# Patient Record
Sex: FEMALE | Race: WHITE | NOT HISPANIC OR LATINO | Employment: FULL TIME
[De-identification: names, ages, dates, MRNs, and addresses within clinical notes are randomized per-mention and may not be internally consistent; named-entity substitution may affect disease eponyms.]

---

## 2017-06-02 ENCOUNTER — RECORDS - HEALTHEAST (OUTPATIENT)
Dept: ADMINISTRATIVE | Facility: OTHER | Age: 57
End: 2017-06-02

## 2017-06-06 ENCOUNTER — RECORDS - HEALTHEAST (OUTPATIENT)
Dept: ADMINISTRATIVE | Facility: OTHER | Age: 57
End: 2017-06-06

## 2017-07-18 ENCOUNTER — HOSPITAL ENCOUNTER (OUTPATIENT)
Dept: MAMMOGRAPHY | Facility: HOSPITAL | Age: 57
Discharge: HOME | End: 2017-07-18
Attending: OBSTETRICS & GYNECOLOGY

## 2017-07-18 DIAGNOSIS — Z12.31 VISIT FOR SCREENING MAMMOGRAM: ICD-10-CM

## 2017-08-02 ENCOUNTER — OFFICE VISIT - HEALTHEAST (OUTPATIENT)
Dept: FAMILY MEDICINE | Facility: CLINIC | Age: 57
End: 2017-08-02

## 2017-08-02 DIAGNOSIS — Z00.00 HEALTH CARE MAINTENANCE: ICD-10-CM

## 2017-08-02 DIAGNOSIS — S62.102A LEFT WRIST FRACTURE: ICD-10-CM

## 2017-08-02 DIAGNOSIS — Z13.820 OSTEOPOROSIS SCREENING: ICD-10-CM

## 2017-08-02 LAB
CHOLEST SERPL-MCNC: 286 MG/DL
FASTING STATUS PATIENT QL REPORTED: YES
HDLC SERPL-MCNC: 97 MG/DL
LDLC SERPL CALC-MCNC: 174 MG/DL
TRIGL SERPL-MCNC: 74 MG/DL

## 2017-08-02 ASSESSMENT — MIFFLIN-ST. JEOR: SCORE: 1269.59

## 2017-08-07 ENCOUNTER — COMMUNICATION - HEALTHEAST (OUTPATIENT)
Dept: FAMILY MEDICINE | Facility: CLINIC | Age: 57
End: 2017-08-07

## 2017-08-24 ENCOUNTER — RECORDS - HEALTHEAST (OUTPATIENT)
Dept: BONE DENSITY | Facility: CLINIC | Age: 57
End: 2017-08-24

## 2017-08-24 ENCOUNTER — RECORDS - HEALTHEAST (OUTPATIENT)
Dept: ADMINISTRATIVE | Facility: OTHER | Age: 57
End: 2017-08-24

## 2017-08-24 DIAGNOSIS — Z13.820 ENCOUNTER FOR SCREENING FOR OSTEOPOROSIS: ICD-10-CM

## 2017-08-30 ENCOUNTER — COMMUNICATION - HEALTHEAST (OUTPATIENT)
Dept: FAMILY MEDICINE | Facility: CLINIC | Age: 57
End: 2017-08-30

## 2017-08-30 DIAGNOSIS — M85.80 OSTEOPENIA: ICD-10-CM

## 2017-09-05 ENCOUNTER — COMMUNICATION - HEALTHEAST (OUTPATIENT)
Dept: FAMILY MEDICINE | Facility: CLINIC | Age: 57
End: 2017-09-05

## 2017-12-07 ENCOUNTER — OFFICE VISIT - HEALTHEAST (OUTPATIENT)
Dept: INTERNAL MEDICINE | Facility: CLINIC | Age: 57
End: 2017-12-07

## 2017-12-07 DIAGNOSIS — M85.80 LOW BONE MASS: ICD-10-CM

## 2017-12-07 ASSESSMENT — MIFFLIN-ST. JEOR: SCORE: 1324.76

## 2018-06-22 ENCOUNTER — RECORDS - HEALTHEAST (OUTPATIENT)
Dept: ADMINISTRATIVE | Facility: OTHER | Age: 58
End: 2018-06-22

## 2018-08-03 ENCOUNTER — OFFICE VISIT - HEALTHEAST (OUTPATIENT)
Dept: FAMILY MEDICINE | Facility: CLINIC | Age: 58
End: 2018-08-03

## 2018-08-03 DIAGNOSIS — F41.9 ANXIETY: ICD-10-CM

## 2018-08-03 DIAGNOSIS — M85.80 OSTEOPENIA: ICD-10-CM

## 2018-08-03 DIAGNOSIS — Z00.00 HEALTH CARE MAINTENANCE: ICD-10-CM

## 2018-08-03 LAB
CHOLEST SERPL-MCNC: 308 MG/DL
FASTING STATUS PATIENT QL REPORTED: YES
FASTING STATUS PATIENT QL REPORTED: YES
GLUCOSE BLD-MCNC: 86 MG/DL (ref 70–125)
HDLC SERPL-MCNC: 112 MG/DL
HGB BLD-MCNC: 14.2 G/DL (ref 12–16)
LDLC SERPL CALC-MCNC: 182 MG/DL
TRIGL SERPL-MCNC: 71 MG/DL

## 2018-08-03 ASSESSMENT — MIFFLIN-ST. JEOR: SCORE: 1322.89

## 2018-08-06 ENCOUNTER — HOSPITAL ENCOUNTER (OUTPATIENT)
Dept: MAMMOGRAPHY | Facility: CLINIC | Age: 58
Discharge: HOME | End: 2018-08-06
Attending: OBSTETRICS & GYNECOLOGY

## 2018-08-06 DIAGNOSIS — Z12.31 VISIT FOR SCREENING MAMMOGRAM: ICD-10-CM

## 2018-08-06 LAB — 25(OH)D3 SERPL-MCNC: 39.4 NG/ML (ref 30–80)

## 2018-08-07 ENCOUNTER — AMBULATORY - HEALTHEAST (OUTPATIENT)
Dept: NURSING | Facility: CLINIC | Age: 58
End: 2018-08-07

## 2018-08-07 ENCOUNTER — COMMUNICATION - HEALTHEAST (OUTPATIENT)
Dept: FAMILY MEDICINE | Facility: CLINIC | Age: 58
End: 2018-08-07

## 2018-08-07 DIAGNOSIS — Z00.00 ROUTINE GENERAL MEDICAL EXAMINATION AT A HEALTH CARE FACILITY: ICD-10-CM

## 2018-10-10 ENCOUNTER — OFFICE VISIT - HEALTHEAST (OUTPATIENT)
Dept: FAMILY MEDICINE | Facility: CLINIC | Age: 58
End: 2018-10-10

## 2018-10-10 DIAGNOSIS — F41.9 ANXIETY: ICD-10-CM

## 2018-10-10 DIAGNOSIS — Z00.00 HEALTH CARE MAINTENANCE: ICD-10-CM

## 2018-10-10 DIAGNOSIS — E78.2 MIXED HYPERLIPIDEMIA: ICD-10-CM

## 2018-10-10 DIAGNOSIS — Z12.83 SKIN CANCER SCREENING: ICD-10-CM

## 2018-10-21 ENCOUNTER — COMMUNICATION - HEALTHEAST (OUTPATIENT)
Dept: FAMILY MEDICINE | Facility: CLINIC | Age: 58
End: 2018-10-21

## 2018-10-22 ENCOUNTER — COMMUNICATION - HEALTHEAST (OUTPATIENT)
Dept: FAMILY MEDICINE | Facility: CLINIC | Age: 58
End: 2018-10-22

## 2018-10-26 ENCOUNTER — COMMUNICATION - HEALTHEAST (OUTPATIENT)
Dept: FAMILY MEDICINE | Facility: CLINIC | Age: 58
End: 2018-10-26

## 2018-11-28 ENCOUNTER — RECORDS - HEALTHEAST (OUTPATIENT)
Dept: ADMINISTRATIVE | Facility: OTHER | Age: 58
End: 2018-11-28

## 2019-06-28 ENCOUNTER — RECORDS - HEALTHEAST (OUTPATIENT)
Dept: ADMINISTRATIVE | Facility: OTHER | Age: 59
End: 2019-06-28

## 2019-08-05 ENCOUNTER — OFFICE VISIT - HEALTHEAST (OUTPATIENT)
Dept: FAMILY MEDICINE | Facility: CLINIC | Age: 59
End: 2019-08-05

## 2019-08-05 DIAGNOSIS — M85.80 OSTEOPENIA, UNSPECIFIED LOCATION: ICD-10-CM

## 2019-08-05 DIAGNOSIS — F41.9 ANXIETY: ICD-10-CM

## 2019-08-05 DIAGNOSIS — Z11.4 SCREENING FOR HIV (HUMAN IMMUNODEFICIENCY VIRUS): ICD-10-CM

## 2019-08-05 DIAGNOSIS — Z00.00 ROUTINE HISTORY AND PHYSICAL EXAMINATION OF ADULT: ICD-10-CM

## 2019-08-05 DIAGNOSIS — E78.2 MIXED HYPERLIPIDEMIA: ICD-10-CM

## 2019-08-05 DIAGNOSIS — Z11.59 NEED FOR HEPATITIS C SCREENING TEST: ICD-10-CM

## 2019-08-05 DIAGNOSIS — H61.22 IMPACTED CERUMEN OF LEFT EAR: ICD-10-CM

## 2019-08-05 LAB
CHOLEST SERPL-MCNC: 279 MG/DL
FASTING STATUS PATIENT QL REPORTED: YES
FASTING STATUS PATIENT QL REPORTED: YES
GLUCOSE BLD-MCNC: 100 MG/DL (ref 70–125)
HDLC SERPL-MCNC: 85 MG/DL
HGB BLD-MCNC: 13 G/DL (ref 12–16)
HIV 1+2 AB+HIV1 P24 AG SERPL QL IA: NEGATIVE
LDLC SERPL CALC-MCNC: 174 MG/DL
TRIGL SERPL-MCNC: 98 MG/DL

## 2019-08-05 ASSESSMENT — MIFFLIN-ST. JEOR: SCORE: 1275.55

## 2019-08-06 LAB
25(OH)D3 SERPL-MCNC: 48.6 NG/ML (ref 30–80)
HCV AB SERPL QL IA: NEGATIVE

## 2019-08-07 ENCOUNTER — COMMUNICATION - HEALTHEAST (OUTPATIENT)
Dept: FAMILY MEDICINE | Facility: CLINIC | Age: 59
End: 2019-08-07

## 2019-08-21 ENCOUNTER — RECORDS - HEALTHEAST (OUTPATIENT)
Dept: ADMINISTRATIVE | Facility: OTHER | Age: 59
End: 2019-08-21

## 2019-08-21 ENCOUNTER — RECORDS - HEALTHEAST (OUTPATIENT)
Dept: BONE DENSITY | Facility: CLINIC | Age: 59
End: 2019-08-21

## 2019-08-21 ENCOUNTER — HOSPITAL ENCOUNTER (OUTPATIENT)
Dept: MAMMOGRAPHY | Facility: CLINIC | Age: 59
Discharge: HOME | End: 2019-08-21
Attending: OBSTETRICS & GYNECOLOGY

## 2019-08-21 DIAGNOSIS — Z78.0 ASYMPTOMATIC MENOPAUSAL STATE: ICD-10-CM

## 2019-08-21 DIAGNOSIS — Z13.820 ENCOUNTER FOR SCREENING FOR OSTEOPOROSIS: ICD-10-CM

## 2019-08-21 DIAGNOSIS — Z12.31 VISIT FOR SCREENING MAMMOGRAM: ICD-10-CM

## 2019-09-13 ENCOUNTER — OFFICE VISIT - HEALTHEAST (OUTPATIENT)
Dept: INTERNAL MEDICINE | Facility: CLINIC | Age: 59
End: 2019-09-13

## 2019-09-13 DIAGNOSIS — M85.80 OSTEOPENIA, UNSPECIFIED LOCATION: ICD-10-CM

## 2020-07-29 ENCOUNTER — COMMUNICATION - HEALTHEAST (OUTPATIENT)
Dept: FAMILY MEDICINE | Facility: CLINIC | Age: 60
End: 2020-07-29

## 2020-07-29 DIAGNOSIS — F41.9 ANXIETY: ICD-10-CM

## 2020-07-29 RX ORDER — HYDROXYZINE HYDROCHLORIDE 25 MG/1
TABLET, FILM COATED ORAL
Qty: 180 TABLET | Refills: 3 | Status: SHIPPED | OUTPATIENT
Start: 2020-07-29 | End: 2021-08-10

## 2021-04-26 ENCOUNTER — OFFICE VISIT - HEALTHEAST (OUTPATIENT)
Dept: FAMILY MEDICINE | Facility: CLINIC | Age: 61
End: 2021-04-26

## 2021-04-26 DIAGNOSIS — E66.3 OVERWEIGHT (BMI 25.0-29.9): ICD-10-CM

## 2021-04-26 DIAGNOSIS — Z00.00 ROUTINE HISTORY AND PHYSICAL EXAMINATION OF ADULT: ICD-10-CM

## 2021-04-26 DIAGNOSIS — M85.80 OSTEOPENIA, UNSPECIFIED LOCATION: ICD-10-CM

## 2021-04-26 LAB
CHOLEST SERPL-MCNC: 304 MG/DL
FASTING STATUS PATIENT QL REPORTED: YES
FASTING STATUS PATIENT QL REPORTED: YES
GLUCOSE BLD-MCNC: 85 MG/DL (ref 70–125)
HDLC SERPL-MCNC: 86 MG/DL
HGB BLD-MCNC: 13.7 G/DL (ref 12–16)
LDLC SERPL CALC-MCNC: 200 MG/DL
TRIGL SERPL-MCNC: 89 MG/DL

## 2021-04-26 RX ORDER — IBUPROFEN 100 MG/5ML
SUSPENSION, ORAL (FINAL DOSE FORM) ORAL EVERY 6 HOURS PRN
Status: SHIPPED | COMMUNITY
Start: 2021-04-26 | End: 2022-07-06

## 2021-04-26 ASSESSMENT — MIFFLIN-ST. JEOR: SCORE: 1329.93

## 2021-04-27 LAB — 25(OH)D3 SERPL-MCNC: 48.1 NG/ML (ref 30–80)

## 2021-04-28 ENCOUNTER — COMMUNICATION - HEALTHEAST (OUTPATIENT)
Dept: FAMILY MEDICINE | Facility: CLINIC | Age: 61
End: 2021-04-28

## 2021-05-07 ENCOUNTER — HOSPITAL ENCOUNTER (OUTPATIENT)
Dept: MAMMOGRAPHY | Facility: CLINIC | Age: 61
Discharge: HOME | End: 2021-05-07
Attending: FAMILY MEDICINE
Payer: COMMERCIAL

## 2021-05-07 DIAGNOSIS — Z12.31 VISIT FOR SCREENING MAMMOGRAM: ICD-10-CM

## 2021-05-31 VITALS — WEIGHT: 161.6 LBS | HEIGHT: 66 IN | BODY MASS INDEX: 25.97 KG/M2

## 2021-05-31 VITALS — WEIGHT: 151.19 LBS | HEIGHT: 66 IN | BODY MASS INDEX: 24.3 KG/M2

## 2021-05-31 NOTE — PROGRESS NOTES
Assessment:        1. Routine history and physical examination of adult  Glucose    Hemoglobin   2. Need for hepatitis C screening test  Hepatitis C Antibody (Anti-HCV)   3. Screening for HIV (human immunodeficiency virus)  HIV Antigen/Antibody Screening Cascade   4. Mixed hyperlipidemia  Lipid Cascade   5. Anxiety  hydrOXYzine HCl (ATARAX) 25 MG tablet   6. Impacted cerumen of left ear  Ear cerumen removal   7. Osteopenia, unspecified location  Vitamin D, Total (25-Hydroxy)       Plan:      1. Healthcare maintenance: Check fasting lipids, glucose and hemoglobin today.  Mammogram was ordered by gynecologist.  Colonoscopy is up-to-date.  Pap smears are performed by her gynecologist and was last done in 2017.  Continue with regular exercise, adequate calcium and vitamin D intake and regular vision and dental exams.  Hepatitis C and HIV screens will be performed today.Follow-up in 1 year for yearly physical  2. Hyperlipidemia: Check lipids.  Continue with weight loss and healthy lifestyle changes  3. Anxiety: Continue hydroxyzine on an as-needed basis.  Continue regular exercises and stress management  4. Cerumen impaction left ear: Ear lavage performed today by CSS  5. Osteopenia: Check vitamin D level.  Continue vitamin D  supplement and adequate calcium intake.  Continue regular weightbearing exercise.  She has bone density scan ordered.  She will schedule follow-up with osteoporosis specialist.          Subjective:      Shawna Powell is a 59 y.o. female who presents for an annual exam.  We reviewed her health history.  No significant changes in her health since she was seen for physical last year.  She has history of osteopenia.  She is taking a vitamin D supplement.  She gets calcium in her diet.  Bone density scan was ordered by her gynecologist.  She will plan to schedule follow-up with osteoporosis specialist.  She has been exercising regularly.  She has been working hard on improving her diet and has  lost about 16 pounds since the beginning of the year.  She is feeling better with these lifestyle changes.  She gets regular vision and dental exams.  She uses hydroxyzine on an as-needed basis for anxiety.  Takes no other regular medications.  We reviewed and updated family and social history.  Meds and allergies are reviewed and updated.  She sees a dermatologist regularly.  Review of systems is assessed and is otherwise negative.  No other concerns or questions.    Healthy Habits:   Regular Exercise: Yes  Sunscreen Use: Yes  Healthy Diet: Yes  Dental Visits Regularly: Yes  Seat Belt: Yes  Self Breast Exam Monthly:Yes  Hemoccults: N/A  Flex Sig: N/A  Colonoscopy: Yes  Lipid Profile: Yes  Glucose Screen: Yes  Prevention of Osteoporosis: Yes  Last Dexa:         Immunization History   Administered Date(s) Administered     Influenza, inj, historic,unspecified 10/02/2018     Tdap 2017     ZOSTER, RECOMBINANT, IM 2018, 10/10/2018     Immunization status: up to date and documented.      Gynecologic History  No LMP recorded. Patient is postmenopausal.  Contraception: post menopausal status  Last Pap: . Results were: normal  Last mammogram: . Results were: normal      OB History    Para Term  AB Living       0         SAB TAB Ectopic Multiple Live Births                   Current Outpatient Medications   Medication Sig Dispense Refill     cholecalciferol, vitamin D3, (VITAMIN D3) 2,000 unit capsule Take 1,000 Units by mouth daily.       hydrOXYzine HCl (ATARAX) 25 MG tablet Take 1-2 tablets by mouth at bedtime as needed 180 tablet 3     ACETAMINOPHEN (TYLENOL ORAL) Take by mouth as needed.       No current facility-administered medications for this visit.      Past Medical History:   Diagnosis Date     Abnormal Pap smear     Positive HPV, colp x2, cryotherapy     Breast cyst      Herpes      Past Surgical History:   Procedure Laterality Date     BREAST CYST ASPIRATION Left       CERVIX LESION DESTRUCTION       ESSURE TUBAL LIGATION       PTERYGIUM EXCISION W/ GRAFT       TONSILLECTOMY       Patient has no known allergies.  Family History   Problem Relation Age of Onset     Breast cancer Mother 55        HRT     GLENN disease Mother      Cancer Mother         melanoma     Stomach cancer Paternal Grandmother 85     Hypertension Paternal Uncle      Diabetes Paternal Uncle      Heart attack Paternal Uncle      Alcohol abuse Father      Alcohol abuse Paternal Grandfather      Social History     Socioeconomic History     Marital status: Single     Spouse name: Not on file     Number of children: Not on file     Years of education: Not on file     Highest education level: Not on file   Occupational History     Not on file   Social Needs     Financial resource strain: Not on file     Food insecurity:     Worry: Not on file     Inability: Not on file     Transportation needs:     Medical: Not on file     Non-medical: Not on file   Tobacco Use     Smoking status: Never Smoker     Smokeless tobacco: Never Used   Substance and Sexual Activity     Alcohol use: No     Drug use: No     Sexual activity: Never   Lifestyle     Physical activity:     Days per week: Not on file     Minutes per session: Not on file     Stress: Not on file   Relationships     Social connections:     Talks on phone: Not on file     Gets together: Not on file     Attends Alevism service: Not on file     Active member of club or organization: Not on file     Attends meetings of clubs or organizations: Not on file     Relationship status: Not on file     Intimate partner violence:     Fear of current or ex partner: Not on file     Emotionally abused: Not on file     Physically abused: Not on file     Forced sexual activity: Not on file   Other Topics Concern     Not on file   Social History Narrative     Not on file       Review of Systems  General:  Denies problem  Eyes: Denies problem  Ears/Nose/Throat: Denies  "problem  Cardiovascular: Denies problem  Respiratory:  Denies problem  Gastrointestinal:  Denies problem, Genitourinary: Denies problem  Musculoskeletal:  Denies problem  Skin: Denies problem  Neurologic: Denies problem  Psychiatric: Denies problem  Endocrine: Denies problem  Heme/Lymphatic: Denies problem   Allergic/Immunologic: Denies problem        Objective:         /64 (Patient Site: Right Arm, Patient Position: Sitting, Cuff Size: Adult Large)   Pulse 80   Temp 98.6  F (37  C) (Oral)   Ht 5' 5.5\" (1.664 m)   Wt 152 lb 8 oz (69.2 kg)   BMI 24.99 kg/m        Physical Exam:  General Appearance: Alert, cooperative, no distress, appears stated age  Head: Normocephalic, without obvious abnormality, atraumatic  Eyes: PERRL, conjunctiva/corneas clear, EOM's intact  Ears: Right TM normal.  Cerumen impaction present in left external ear.  Nose: Nares normal, septum midline,mucosa normal, no drainage  Throat: Lips, mucosa, and tongue normal; teeth and gums normal  Neck: Supple, symmetrical, trachea midline, no adenopathy;  thyroid: not enlarged, symmetric, no tenderness/mass/nodules;  Back: Symmetric, no curvature, ROM normal  Lungs: Clear to auscultation bilaterally, respirations unlabored  Heart: Regular rate and rhythm, S1 and S2 normal, no murmur, rub, or gallop, Abdomen: Soft, non-tender, bowel sounds active all four quadrants,  no masses, no organomegaly  Pelvic:Not examined  Extremities: Extremities normal, atraumatic, no cyanosis or edema  Skin: Skin color, texture, turgor normal, no rashes or lesions  Lymph nodes: Cervical, supraclavicular, and axillary nodes normal  Neurologic: Normal        "

## 2021-05-31 NOTE — PATIENT INSTRUCTIONS - HE
Hello! Here are the two sites that can be used for the CBD oil. This first link ([employer]) is what the pain clinic uses and it is a little cheaper.     https://[email or web address]/classic-hemp-cbd-oil/   20 drops twice a day. However, I would start with 5 drops twice a day, then increase up. Easier to add more drops than start high and take it away :) Place on your tongue.     Https://www.[employer]/us/[email or web address]&[email or web address]&[email or web address]&Ntt=CBD%20Gold%20Original%20w/Dropper&tpsearch=true   Place two pumps under the tongue, once or twice daily. Hold for 60-90 seconds, then swallow.           Osteoporosis specialist--Dr. Love

## 2021-06-01 VITALS — WEIGHT: 162.06 LBS | HEIGHT: 66 IN | BODY MASS INDEX: 26.05 KG/M2

## 2021-06-01 NOTE — PROGRESS NOTES
[facility] follow up Note    Assessment/Plan:  1. Osteopenia, unspecified location  Here to review bone density.  Since last bone densitometry-2017, all has remained stable.  Low risk bone density with osteopenia.  Recommendations: Continue as you are doing with both balance and weightbearing exercises.  Continue with vitamin D supplementation at 3000 IUs daily with optimal levels reported in the labs.  Continue with a goal of the thousand milligrams of calcium daily via dietary measures.      Follow up as needed    Rand Love MD    Chief Complaint:  Chief Complaint   Patient presents with     Follow-up     DXA       History of Present Illness:  Shawna is a 59 y.o. female who is here today for discussion of bone health.  Of note, I met her following a bone density in 2017 that revealed low bone mass.  It was a low risk bone densitometry.  At that time, we had an extensive discussion of bone health.  She is here today to review her bone density.  Of note, since last visit, she has increased vitamin D3 to 3000 IUs daily.  Her most recent laboratory study is ideal.  She is working hard with regard to dietary calcium as well.  She eats a variety of dark green leafy vegetables, drinks a glass of milk per day and has cheese.  She has joined a fitness club that has a variety of weightbearing exercises.    Bone densitometry is reviewed with her today.  The images are posted and reviewed.    Review of Systems:  A comprehensive review of systems was performed and was otherwise negative    PFSH:  Social History: No changes  Social History     Tobacco Use   Smoking Status Never Smoker   Smokeless Tobacco Never Used       Past History: No new history with regard to bone health  Current Outpatient Medications   Medication Sig Dispense Refill     ACETAMINOPHEN (TYLENOL ORAL) Take by mouth as needed.       cholecalciferol, vitamin D3, (VITAMIN D3) 2,000 unit capsule Take 1,000 Units by mouth  daily.       hydrOXYzine HCl (ATARAX) 25 MG tablet Take 1-2 tablets by mouth at bedtime as needed 180 tablet 3     No current facility-administered medications for this visit.        Family History:     Physical Exam:  General Appearance:   Pleasant, well-appearing and in no acute distress  Vitals:    09/13/19 0738   BP: 98/64   Patient Site: Left Arm   Patient Position: Sitting   Cuff Size: Adult Regular   Pulse: 68   SpO2: 96%   Weight: 150 lb 6 oz (68.2 kg)     Wt Readings from Last 3 Encounters:   09/13/19 150 lb 6 oz (68.2 kg)   08/05/19 152 lb 8 oz (69.2 kg)   10/10/18 160 lb 8 oz (72.8 kg)     Body mass index is 24.64 kg/m .

## 2021-06-02 VITALS — WEIGHT: 160.5 LBS | BODY MASS INDEX: 26.1 KG/M2

## 2021-06-03 VITALS
HEART RATE: 68 BPM | WEIGHT: 150.38 LBS | OXYGEN SATURATION: 96 % | SYSTOLIC BLOOD PRESSURE: 98 MMHG | BODY MASS INDEX: 24.64 KG/M2 | DIASTOLIC BLOOD PRESSURE: 64 MMHG

## 2021-06-03 VITALS — WEIGHT: 152.5 LBS | BODY MASS INDEX: 24.51 KG/M2 | HEIGHT: 66 IN

## 2021-06-05 VITALS
WEIGHT: 165 LBS | HEART RATE: 68 BPM | BODY MASS INDEX: 27.49 KG/M2 | HEIGHT: 65 IN | SYSTOLIC BLOOD PRESSURE: 130 MMHG | DIASTOLIC BLOOD PRESSURE: 80 MMHG

## 2021-06-12 NOTE — PROGRESS NOTES
Assessment:        1. Health care maintenance  Lipid [place]ubert FASTING   2. Left wrist fracture  Comprehensive Metabolic Panel    Vitamin D, Total (25-Hydroxy)    Thyroid Stimulating Hormone (TSH)    HM2(CBC w/o Differential)   3. Osteoporosis screening  DXA Bone Density Scan       Plan:      1. Healthcare maintenance: Pap smear, mammogram and colonoscopy are up-to-date.  We will check fasting lipids and glucose.  Encouraged regular exercise, healthy diet and adequate calcium intake.  Adacel vaccine administered.  Discussed vaccine and common side effects  2. Left wrist fracture: This has healed.  She is concerned about her bone density and vitamin D status.  We will check a comprehensive metabolic panel, vitamin D, TSH and hemogram today.  Bone density scan also ordered.  Discussed importance of adequate calcium and vitamin D intake.  Further recommendations will be made once those results are available.          Subjective:      Shawna Powell is a 57 y.o. female who presents for an annual exam.  We reviewed her health history.  Overall healthy individual.  She is concerned about possible vitamin D deficiency as well as her bone density.  She had a fracture of the left wrist in May of this year.  She was placed in a cast.  Reports that has healed well.  She did do some physical therapy.  Fracture occurred after a fall.  She was trying to do some roller skiing.  She reports that in January she also had a crown placed on 1 of her teeth due to a cracked tooth.  She was advised to have her vitamin D level checked.  She reports that she eats very healthy.  Eats a clean diet and tries to avoid processed foods.  Does not eat fried foods.  Eats plenty of fresh fruits and vegetables.  Admits that she does not get a lot of calcium in her diet.  She does eat cheese every day and uses better but does not drink much milk.  Recently started a calcium supplement.  Also takes a multivitamin but this does not have calcium.   There is not a strong family history of osteoporosis that she is aware of.  She does have a history of following a vegetarian and vegan diet but she is currently eating meat.  We reviewed past medical history, surgeries and family history.  She is not .  She has no children.  She works at [employer] and works with [employer]  She enjoys [employer] and participates in field trips and enjoys hunting for mushrooms.  She is up-to-date on her colonoscopy and mammogram.  She sees a gynecologist for Pap and breast exams.  Recently had that performed.  History of abnormal Paps over 15 years ago.  Subsequent Paps have been normal following cryosurgery of the cervix.  Review of systems is assessed and is otherwise negative.  No other concerns or questions today.    Healthy Habits:   Regular Exercise: Yes  Sunscreen Use: Yes  Healthy Diet: Yes  Dental Visits Regularly: Yes  Seat Belt: Yes  Sexually active: No  Self Breast Exam Monthly:Yes  Hemoccults: No  Flex Sig: No  Colonoscopy: Yes  Lipid Profile: Yes  Glucose Screen: Yes  Prevention of Osteoporosis: Yes  Last Dexa: N/A  Guns at Home:  N/A      Immunization History   Administered Date(s) Administered     Tdap 2017     Immunization status: due today.      Gynecologic History  No LMP recorded. Patient is postmenopausal.  Contraception: post menopausal status  Last Pap: . Results were: normal  Last mammogram: . Results were: normal      OB History    Para Term  AB Living     0      SAB TAB Ectopic Multiple Live Births                    Current Outpatient Prescriptions   Medication Sig Dispense Refill     ACETAMINOPHEN (TYLENOL ORAL) Take by mouth as needed.       No current facility-administered medications for this visit.      Past Medical History:   Diagnosis Date     Abnormal Pap smear     Positive HPV, colp x2, cryotherapy     Breast cyst      Herpes      Past Surgical History:   Procedure Laterality Date     BREAST CYST ASPIRATION Left       "CERVIX LESION DESTRUCTION       ESSURE TUBAL LIGATION       PTERYGIUM EXCISION W/ GRAFT       TONSILLECTOMY       Review of patient's allergies indicates no known allergies.  Family History   Problem Relation Age of Onset     Breast cancer Mother 55     HRT     GLENN disease Mother      Stomach cancer Paternal Grandmother 85     Hypertension Paternal Uncle      Diabetes Paternal Uncle      Heart attack Paternal Uncle      Alcohol abuse Father      Alcohol abuse Paternal Grandfather      Social History     Social History     Marital status: Single     Spouse name: N/A     Number of children: N/A     Years of education: N/A     Occupational History     Not on file.     Social History Main Topics     Smoking status: Never Smoker     Smokeless tobacco: Never Used     Alcohol use No     Drug use: No     Sexual activity: No     Other Topics Concern     Not on file     Social History Narrative       Review of Systems  See HPI    Objective:         /80 (Patient Site: Left Arm, Patient Position: Sitting, Cuff Size: Adult Large)  Pulse 67  Temp 98.2  F (36.8  C) (Tympanic)   Ht 5' 5.5\" (1.664 m)  Wt 151 lb 3 oz (68.6 kg)  SpO2 99%  BMI 24.78 kg/m2      Physical Exam:  General Appearance: Alert, cooperative, no distress, appears stated age  Head: Normocephalic, without obvious abnormality, atraumatic  Eyes: PERRL, conjunctiva/corneas clear, EOM's intact  Ears: Normal TM's and external ear canals, both ears  Nose: Nares normal, septum midline,mucosa normal, no drainage  Throat: Lips, mucosa, and tongue normal; teeth and gums normal  Neck: Supple, symmetrical, trachea midline, no adenopathy;  thyroid: not enlarged, symmetric, no tenderness/mass/nodules;   Back: Symmetric, no curvature, ROM normal  Lungs: Clear to auscultation bilaterally, respirations unlabored  Heart: Regular rate and rhythm, S1 and S2 normal, no murmur, rub, or gallop, Abdomen: Soft, non-tender, bowel sounds active all four quadrants,  no masses, no " organomegaly  Pelvic:Not examined  Extremities: Extremities normal, atraumatic, no cyanosis or edema  Skin: Skin color, texture, turgor normal, no rashes or lesions  Lymph nodes: Cervical, supraclavicular nodes normal  Neurologic: Normal

## 2021-06-14 NOTE — PROGRESS NOTES
"[facility] Bone Health Consult Note    Assessment/Plan:  1. Low bone mass/recent traumatic wrist fracture  Bone density with FRAX assessment revealing low bone mass with low risk for fracture.  Recommendations: Optimize calcium and vitamin D supplementation.  Discussion of diet and dietary calcium was had.  Vitamin D goals are 4/2000 international units of vitamin D3 daily.  Recheck vitamin D in 1 year.  Continue to be vigilant with weightbearing and balance exercise.  Consider adding yoga or Pilates.  Follow-up with bone density in 2 years.  No medications needed    Rand Love MD    Chief Complaint:  Chief Complaint   Patient presents with     Osteoporosis Consult       History of Present Illness:  Shawna is a 57 y.o. female who is here today to have a discussion with regard to overall bone health.  Of note, she is a healthy 57-year-old female who has no past medical history.  She does report a recent radial fracture.  This occurred while roller skiing.  She states the skis \"had no breaks \"and she fell.  She was a nidus that this activity when this occurred.  As a result, she had a bone density done.  This triggered today's discussion.    Bone health review of systems  Lifestyle: She is a non-smoker and does not drink alcohol  Exercise is reviewed.  She is an avid walker and works out with hand weights  Sunlight exposure is reviewed.  She gets 30 minutes of sunlight per day if whether is cooperative.  Dietary history reviewed: She is working hard to increase cheese, fortified soy milk, fortified sheep yogurt, almonds, KALE and green vegetables into her diet.  She limits caffeine to 2 cups of coffee per day  Past medical history review: Fracture at age 57-traumatic  No high risk meds are used  No family history of osteoporosis  General health survey reveals no vision or hearing problems.  She denies any dental problems.  Hormone history: Onset of menstruation at age 14.  Menopause was " "uneventful at age 53    DXA:Assessment:     1. The spine bone density L1-L4 with T-score -1.5.  2. Femoral bone densities show left femoral neck T- score -1.3 and right femoral neck T-score -1.6.  3. Trabecular bone score indicates poor trabecular bone architecture.        57 y.o. female with LOW BONE DENSITY (OSTEOPENIA) and LOW fracture risk with major osteoporotic fracture risk 8.9% and hip fracture risk 0.8%.      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       No current facility-administered medications for this visit.        Family History: No family history of osteoporosis or hip fracture    Physical Exam:  General Appearance:   She is pleasant and well-appearing and in no acute distress  Vitals:    12/07/17 1239   BP: 116/68   Patient Site: Left Arm   Patient Position: Sitting   Cuff Size: Adult Regular   Pulse: 72   Weight: 161 lb 9.6 oz (73.3 kg)   Height: 5' 6\" (1.676 m)     Wt Readings from Last 3 Encounters:   12/07/17 161 lb 9.6 oz (73.3 kg)   08/02/17 151 lb 3 oz (68.6 kg)   05/20/16 150 lb 11.2 oz (68.4 kg)     Body mass index is 26.08 kg/(m^2).        Data Review:    Analysis and Summary of Old Records (2): Reviewed health history    Records Requested (1): 0      Other History Summarized (from other people in the room) (2): 0    Radiology Tests Summarized (XRAY/CT/MRI/DXA) (1): Reviewed bone density with her    Labs Reviewed (1): Reviewed labs from her physical    Medicine Tests Reviewed (EKG/ECHO/COLONOSCOPY/EGD) (1): 0    Independent Review of EKG or X-RAY (2): 0    Time spent about 45 minutes with more than half that time " spent in counseling and discussion of bone health

## 2021-06-17 NOTE — PROGRESS NOTES
Assessment:        1. Routine history and physical examination of adult  Lipid Cascade FASTING    Glucose    Hemoglobin   2. Osteopenia, unspecified location  cholecalciferol, vitamin D3, (VITAMIN D3) 50 mcg (2,000 unit) capsule    Vitamin D, Total (25-Hydroxy)   3. Overweight (BMI 25.0-29.9)         Plan:      1. Healthcare maintenance: We will check fasting lipids, hemoglobin and glucose today.  She has a mammogram scheduled.  Colonoscopy is up-to-date with next colonoscopy due in 2023.  Pap smears are performed by her gynecologist and last Pap was in 2017 and was normal.  Continue with healthy diet and adequate calcium and vitamin D intake.  Encouraged increased physical activity.  Encouraged regular vision and dental exams.  Follow-up in 1 year for annual physical  2. Osteopenia: Will be due for bone density scan later this year.  Continue to get adequate calcium in diet and will continue vitamin D supplement.  Encourage regular weightbearing exercise  3. Overweight: Encouraged regular exercise, healthy diet and weight loss.  4. Trigger finger: Discussed she can see an orthopedic specialist for further treatment  5. Vision changes: Discussed it is possible that the vision changes she had were due to a migraine but also recommended that she schedule an eye exam         The following high BMI interventions were performed this visit: encouragement to exercise    Subjective:      Shawna Powell is a 60 y.o. female who presents for an annual exam.  We reviewed her health history including medications, allergies, family and social history.  No significant changes in her health since she was seen for her last physical in August 2019.  She reports that she really has been mostly staying home and being very careful during the pandemic.  She has been working from home.  She has gained some weight as she has not been exercising as regularly and has been doing more baking.  She has recently started making some changes  in her diet and is working on finding ways to increase her physical activity.  She does miss interacting with other people and her exercise classes.  She reports safety concerns in the neighborhood that she lives in and is starting to think about maybe moving somewhere else.  She is looking forward to retiring in a couple of years.  She has a history of osteopenia.  She is taking a vitamin D supplement.  She gets calcium in her diet.  She has hydroxyzine that she uses on an as-needed basis for anxiety.  She has noticed that this causes some ringing in her ears so she has not been taking it recently.  She does use CBD oil as an alternative.  She sees a gynecologist and has an appointment coming up soon.  She has mammogram scheduled.  She was doing quite a bit of painting over the past year and has developed a trigger finger of the right fourth finger.  She also describes a couple of occasions where she noticed some will be vision and spots in her vision.  Her mother suggested that it was a migraine although patient did not have a headache or any pain or other of the common migraine symptoms.  Review of systems is otherwise negative.  No other concerns or questions.    Healthy Habits:   Regular Exercise: No  Sunscreen Use: Yes  Healthy Diet: Yes  Dental Visits Regularly: Yes  Seat Belt: Yes  Sexually active: N/A  Self Breast Exam Monthly:Yes  Hemoccults: No  Flex Sig: No  Colonoscopy: Yes next due in 2023  Lipid Profile: Yes  Glucose Screen: Yes  Prevention of Osteoporosis: Yes  Last Dexa: 2019.  Due for follow-up in August of this year        Immunization History   Administered Date(s) Administered     Covid-19,PF,[place] 04/08/2021     Influenza, inj, historic,unspecified 10/02/2018, 09/01/2020     Tdap 08/02/2017     ZOSTER, RECOMBINANT, IM 08/07/2018, 10/10/2018     Immunization status: up to date and documented.      Gynecologic History  No LMP recorded. Patient is postmenopausal.  Contraception: post menopausal  status  Last Pap: done with gyn in 2017 Results were: normal  Last mammogram: 2019. Results were: normal      OB History    Para Term  AB Living       0         SAB TAB Ectopic Multiple Live Births                   Current Outpatient Medications   Medication Sig Dispense Refill     ACETAMINOPHEN (TYLENOL ORAL) Take by mouth as needed.       cholecalciferol, vitamin D3, (VITAMIN D3) 50 mcg (2,000 unit) capsule Take 3 capsules (3000 international unit(s)) daily  0     hydrOXYzine HCL (ATARAX) 25 MG tablet Take 1-2 tablets by mouth at bedtime as needed 180 tablet 3     ibuprofen (ADVIL,MOTRIN) 100 mg/5 mL suspension Take by mouth every 6 (six) hours as needed for mild pain (1-3).       No current facility-administered medications for this visit.      Past Medical History:   Diagnosis Date     Abnormal Pap smear     Positive HPV, colp x2, cryotherapy     Breast cyst      Herpes      Past Surgical History:   Procedure Laterality Date     BREAST CYST ASPIRATION Left 2009     CERVIX LESION DESTRUCTION       ESSURE TUBAL LIGATION       PTERYGIUM EXCISION W/ GRAFT       TONSILLECTOMY       Patient has no known allergies.  Family History   Problem Relation Age of Onset     Breast cancer Mother 55        HRT     GLENN disease Mother      Cancer Mother         melanoma     Stomach cancer Paternal Grandmother 85     Hypertension Paternal Uncle      Diabetes Paternal Uncle      Heart attack Paternal Uncle      Alcohol abuse Father      Alcohol abuse Paternal Grandfather      Social History     Socioeconomic History     Marital status: Single     Spouse name: Not on file     Number of children: Not on file     Years of education: Not on file     Highest education level: Not on file   Occupational History     Not on file   Social Needs     Financial resource strain: Not on file     Food insecurity     Worry: Not on file     Inability: Not on file     Transportation needs     Medical: Not on file     Non-medical:  "Not on file   Tobacco Use     Smoking status: Never Smoker     Smokeless tobacco: Never Used   Substance and Sexual Activity     Alcohol use: No     Drug use: No     Sexual activity: Never   Lifestyle     Physical activity     Days per week: Not on file     Minutes per session: Not on file     Stress: Not on file   Relationships     Social connections     Talks on phone: Not on file     Gets together: Not on file     Attends Scientologist service: Not on file     Active member of club or organization: Not on file     Attends meetings of clubs or organizations: Not on file     Relationship status: Not on file     Intimate partner violence     Fear of current or ex partner: Not on file     Emotionally abused: Not on file     Physically abused: Not on file     Forced sexual activity: Not on file   Other Topics Concern     Not on file   Social History Narrative     Not on file       Review of Systems    See HPI      Objective:         /80   Pulse 68   Ht 5' 5.35\" (1.66 m)   Wt 165 lb (74.8 kg)   BMI 27.16 kg/m        Physical Exam:  General Appearance: Alert, cooperative, no distress, appears stated age  Head: Normocephalic, without obvious abnormality, atraumatic  Eyes: PERRL, conjunctiva/corneas clear, EOM's intact  Ears: Normal TM's and external ear canals, both ears  Neck: Supple, symmetrical, trachea midline,   Lungs: Clear to auscultation bilaterally, respirations unlabored  Breasts: No breast masses, tenderness, asymmetry, or nipple discharge.  Heart: Regular rate and rhythm, S1 and S2 normal, no murmur, rub, or gallop, Abdomen: Soft, non-tender, bowel sounds active all four quadrants,  no masses, no organomegaly  Pelvic:Not examined  Extremities: Extremities normal, atraumatic, no cyanosis or edema  Skin: Skin color, texture, turgor normal, no rashes or lesions  Neurologic: Normal          "

## 2021-06-19 NOTE — PROGRESS NOTES
Assessment:        1. Health care maintenance  Lipid Cascade FASTING    Glucose    Hemoglobin   2. Osteopenia  Vitamin D, Total (25-Hydroxy)   3. Anxiety         Plan:      1. Healthcare maintenance: Check fasting lipids, glucose and hemoglobin today.  She will schedule mammogram.  Colonoscopy is up-to-date.  Pap smears are performed by her gynecologist and she reports that this is up-to-date as well.  Release of information form will be signed to obtain those records.  Continue with regular exercise, adequate calcium and vitamin D intake and regular vision and dental exams.  Recommend yearly influenza vaccine.  Recommend the new shingles vaccine as well.  Recommend follow-up in 1 year.  2. Osteopenia: Continue vitamin D supplement.  Check vitamin D level today.  Encouraged 6285-8605 mg of calcium daily.  Will plan follow-up bone density scan in 2019.  Encouraged regular weightbearing exercise.  3. Anxiety: Provided prescription for hydroxyzine.  Counseled on use of medication and side effects.  She will work on lifestyle changes to manage anxiety.  Contact me if she would like referral for counseling or follow-up if she feels that symptoms are worsening and hydroxyzine and lifestyle changes are not effective for managing her anxiety.          Subjective:      Shawna Powell is a 58 y.o. female who presents for an annual exam.  We reviewed her health history.  She was seen last year for physical.  Had a fracture of her left wrist at that time so was sent for bone density scan.  This showed osteopenia.  She had appointment with osteoporosis specialist.  She was started on a vitamin D supplement and follow-up DEXA scan was recommended in 2 years.  She reports that she is taking 2000 IUs of vitamin D daily.  Does not take any other medications regularly.  Occasionally takes a multivitamin.  No new allergies.  No changes in health since her last physical.  Reviewed and updated family and social history.  She does  get calcium in her diet.  Drinks about 2 glasses of soy milk at night.  Typically has yogurt for breakfast in the morning.  Eats very healthy with several servings of fruits and vegetables daily.  She exercises regularly.  Gets regular vision and dental exams.  She does report that she has been feeling more anxious recently.  She cannot really pinpoint the reason for this but does feel that it is related to current events.  She uses public transportation and admits that she feels a little bit uncomfortable and unsafe and this causes her some concern.  She recalls that several years ago she saw dermatologist for some pruritus and she was prescribed hydroxyzine.  She noticed that when she took this medication also helped her feel less anxious.  She is hesitant to take any other medication for treatment of anxiety.  She is not sure if she would be interested in counseling.  She has been trying to make some lifestyle changes and simplify her life a little bit and would like to try this first to see if she is able to manage her anxiety with those measures.  Would also be open to a trial of hydroxyzine again.  Review of systems is assessed and is otherwise negative.  No other concerns or questions today.    Healthy Habits:   Regular Exercise: Yes  Sunscreen Use: Yes  Healthy Diet: Yes  Dental Visits Regularly: Yes  Seat Belt: Yes  Self Breast Exam Monthly:Yes  Hemoccults: N/A  Flex Sig: N/A  Colonoscopy: Yes--f/u   Lipid Profile: Yes  Glucose Screen: Yes  Prevention of Osteoporosis: Yes  Last Dexa:         Immunization History   Administered Date(s) Administered     Tdap 2017     Immunization status: up to date and documented.      Gynecologic History  No LMP recorded. Patient is postmenopausal.  Contraception: none  Last Pap: 2017. Results were: normal  Last mammogram: . Results were: normal      OB History    Para Term  AB Living     0      SAB TAB Ectopic Multiple Live Births                     Current Outpatient Prescriptions   Medication Sig Dispense Refill     cholecalciferol, vitamin D3, (VITAMIN D3) 2,000 unit capsule Take 1,000 Units by mouth daily.       ACETAMINOPHEN (TYLENOL ORAL) Take by mouth as needed.       hydrOXYzine HCl (ATARAX) 25 MG tablet Take 1-2 tablets by mouth as needed for anxiety 30 tablet 1     No current facility-administered medications for this visit.      Past Medical History:   Diagnosis Date     Abnormal Pap smear 1998    Positive HPV, colp x2, cryotherapy     Breast cyst      Herpes      Past Surgical History:   Procedure Laterality Date     BREAST CYST ASPIRATION Left 2009     CERVIX LESION DESTRUCTION       ESSURE TUBAL LIGATION       PTERYGIUM EXCISION W/ GRAFT       TONSILLECTOMY       Review of patient's allergies indicates no known allergies.  Family History   Problem Relation Age of Onset     Breast cancer Mother 55     HRT     GLENN disease Mother      Stomach cancer Paternal Grandmother 85     Hypertension Paternal Uncle      Diabetes Paternal Uncle      Heart attack Paternal Uncle      Alcohol abuse Father      Alcohol abuse Paternal Grandfather      Social History     Social History     Marital status: Single     Spouse name: N/A     Number of children: N/A     Years of education: N/A     Occupational History     Not on file.     Social History Main Topics     Smoking status: Never Smoker     Smokeless tobacco: Never Used     Alcohol use No     Drug use: No     Sexual activity: No     Other Topics Concern     Not on file     Social History Narrative       Review of Systems  General:  Denies problem  Eyes: Denies problem  Ears/Nose/Throat: Denies problem  Cardiovascular: Denies problem  Respiratory:  Denies problem  Gastrointestinal:  Denies problem, Genitourinary: Denies problem  Musculoskeletal:  Denies problem  Skin: Denies problem  Neurologic: Denies problem  Psychiatric: Denies problem  Endocrine: Denies problem  Heme/Lymphatic: Denies problem  "  Allergic/Immunologic: Denies problem        Objective:         /70 (Patient Site: Right Arm, Patient Position: Sitting, Cuff Size: Adult Large)  Pulse 70  Temp 98.3  F (36.8  C) (Oral)   Ht 5' 5.75\" (1.67 m)  Wt 162 lb 1 oz (73.5 kg)  SpO2 94%  BMI 26.36 kg/m2      Physical Exam:  General Appearance: Alert, cooperative, no distress, appears stated age  Head: Normocephalic, without obvious abnormality, atraumatic  Eyes: PERRL, conjunctiva/corneas clear, EOM's intact  Ears: Normal TM's and external ear canals, both ears  Nose: Nares normal, septum midline,mucosa normal, no drainage  Throat: Lips, mucosa, and tongue normal; teeth and gums normal  Neck: Supple, symmetrical, trachea midline, no adenopathy;  thyroid: not enlarged, symmetric, no tenderness/mass/nodules;   Back: Symmetric, no curvature, ROM normal  Lungs: Clear to auscultation bilaterally, respirations unlabored  Breasts: No breast masses, tenderness, asymmetry, or nipple discharge.  Heart: Regular rate and rhythm, S1 and S2 normal, no murmur, rub, or gallop, Abdomen: Soft, non-tender, bowel sounds active all four quadrants,  no masses, no organomegaly  Pelvic:Not examined  Extremities: Extremities normal, atraumatic, no cyanosis or edema  Skin: Skin color, texture, turgor normal, no rashes or lesions  Lymph nodes: Cervical, supraclavicular, and axillary nodes normal  Neurologic: Normal        "

## 2021-06-19 NOTE — LETTER
Letter by Daisy Jackson MD at      Author: Daisy Jackson MD Service: -- Author Type: --    Filed:  Encounter Date: 8/7/2019 Status: (Other)         Shawna Powell  [address]  [place] [address]             August 7, 2019         Dear Ms. Powell,    Below are the results from your recent visit:    Resulted Orders   Lipid Cascade   Result Value Ref Range    Cholesterol 279 (H) <=199 mg/dL    Triglycerides 98 <=149 mg/dL    HDL Cholesterol 85 >=50 mg/dL    LDL Calculated 174 (H) <=129 mg/dL    Patient Fasting > 8hrs? Yes    HIV Antigen/Antibody Screening Cascade   Result Value Ref Range    HIV Antigen / Antibody Negative Negative    Narrative    Method is Abbott HIV Ag/Ab for the detection of HIV p24 antigen, HIV-1 antibodies and HIV-2 antibodies.   Hepatitis C Antibody (Anti-HCV)   Result Value Ref Range    Hepatitis C Ab Negative Negative   Glucose   Result Value Ref Range    Glucose 100 70 - 125 mg/dL    Patient Fasting > 8hrs? Yes     Narrative    Fasting Glucose reference range is 70-99 mg/dL per  American Diabetes Association (ADA) guidelines.   Hemoglobin   Result Value Ref Range    Hemoglobin 13.0 12.0 - 16.0 g/dL   Vitamin D, Total (25-Hydroxy)   Result Value Ref Range    Vitamin D, Total (25-Hydroxy) 48.6 30.0 - 80.0 ng/mL    Narrative    Deficiency <10.0 ng/mL  Insufficiency 10.0-29.9 ng/mL  Sufficiency 30.0-80.0 ng/mL  Toxicity (possible) >100.0 ng/mL       Hemoglobin and vitamin D levels are normal.  Blood sugar is slightly high at 100.  HIV and hepatitis C screening tests are negative.  Cholesterol levels have improved since last year but remain moderately elevated.  Your calculated risk of developing heart disease within the next 10 years is considered to be low.  At this time, I continue to recommend intensive lifestyle changes to improve your cholesterol and keep blood sugars under control.  I recommend a recheck in 1 year.    Please call with questions or contact us using  [facility].    Sincerely,        Electronically signed by Daisy Jackson MD

## 2021-06-20 NOTE — PROGRESS NOTES
Assessment/Plan:     1. Anxiety     2. Health care maintenance  Varicella Zoster, Recombinant Vaccine IM   3. Skin cancer screening  Ambulatory referral to Dermatology   4. Mixed hyperlipidemia         Diagnoses and all orders for this visit:    Anxiety  Symptoms have significantly improved with use of hydroxyzine.  She would like to continue this medication.  Refill sent to pharmacy.    Health care maintenance  -     Varicella Zoster, Recombinant Vaccine IM    Skin cancer screening  -     Ambulatory referral to Dermatology    Mixed hyperlipidemia  Discussed recent cholesterol panel results with patient.  Cholesterol levels remain elevated.  Overall risk of developing coronary artery disease in the next 10 years remains low at less than 2%.  Discussed that at this time I would recommend she continue to work on healthy lifestyle changes including healthy diet, regular exercise and weight loss.  She admits that she could do better in these areas.  We will plan to recheck at physical exam next year.               Subjective:      Shawna Powell is a 58 y.o. female who comes in today for medication check, review of recent lab results as well as second dose of she and Ric.  She was seen for a physical exam in August.  At that time she reported that she was expressing some increased symptoms of anxiety.  She was given a prescription for hydroxyzine with instructions to take 1-2 tablets every 6 hours as needed for anxiety or sleep.  She has found that the hydroxyzine has made a significant difference in her life as far as reducing her anxiety.  She has been taking it in the evening when she gets home from work.  Will take one, sometimes 2 tablets.  Helps her sleep better.  She wakes up feeling well rested.  She finds that she is no longer grinding her teeth or clenching her jaw at night and this has made a big difference in the jaw pain that she experiences during the day.  She is not experiencing nightmares.  She  just overall feels calmer and more even keeled.  She does need a refill of the medication.  She also recently found out that her mother was diagnosed with melanoma.  Found this out yesterday.  She is wondering if she should see a dermatologist for a full body skin exam.  She does not have any particular lesions that are of concern.  She also had blood work done at her physical but did not receive a result letter and would like to discuss results today.  We reviewed medications and allergies.  No other concerns or questions.  Review of systems otherwise negative.    Current Outpatient Prescriptions   Medication Sig Dispense Refill     ACETAMINOPHEN (TYLENOL ORAL) Take by mouth as needed.       cholecalciferol, vitamin D3, (VITAMIN D3) 2,000 unit capsule Take 1,000 Units by mouth daily.       hydrOXYzine HCl (ATARAX) 25 MG tablet Take 1-2 tablets by mouth at bedtime as needed 180 tablet 3     No current facility-administered medications for this visit.        Past Medical History, Family History, and Social History reviewed.  Past Medical History:   Diagnosis Date     Abnormal Pap smear 1998    Positive HPV, colp x2, cryotherapy     Breast cyst      Herpes      Past Surgical History:   Procedure Laterality Date     BREAST CYST ASPIRATION Left 2009     CERVIX LESION DESTRUCTION       ESSURE TUBAL LIGATION       PTERYGIUM EXCISION W/ GRAFT       TONSILLECTOMY       Review of patient's allergies indicates no known allergies.  Family History   Problem Relation Age of Onset     Breast cancer Mother 55     HRT     GLENN disease Mother      Cancer Mother      melanoma     Stomach cancer Paternal Grandmother 85     Hypertension Paternal Uncle      Diabetes Paternal Uncle      Heart attack Paternal Uncle      Alcohol abuse Father      Alcohol abuse Paternal Grandfather      Social History     Social History     Marital status: Single     Spouse name: N/A     Number of children: N/A     Years of education: N/A     Occupational  History     Not on file.     Social History Main Topics     Smoking status: Never Smoker     Smokeless tobacco: Never Used     Alcohol use No     Drug use: No     Sexual activity: No     Other Topics Concern     Not on file     Social History Narrative         Review of systems is as stated in HPI, and the remainder of the 10 system review is otherwise negative.    Objective:     Vitals:    10/10/18 0945   BP: 120/74   Patient Site: Right Arm   Patient Position: Sitting   Cuff Size: Adult Large   Pulse: 70   Temp: 98.2  F (36.8  C)   TempSrc: Oral   SpO2: 97%   Weight: 160 lb 8 oz (72.8 kg)    Body mass index is 26.1 kg/(m^2).    General appearance: alert, appears stated age and cooperative  Psych: mood appropriate, affect normal    Results: NISH-7: 8      This note has been dictated using voice recognition software. Any grammatical or context distortions are unintentional and inherent to the the software.

## 2021-06-21 NOTE — LETTER
Letter by Daisy Jackson MD at      Author: Daisy Jackson MD Service: -- Author Type: --    Filed:  Encounter Date: 4/28/2021 Status: (Other)         Shawna Powell  [address]  [address] [place] [address]             April 28, 2021         Dear Ms. Powell,    Below are the results from your recent visit:    Resulted Orders   Vitamin D, Total (25-Hydroxy)   Result Value Ref Range    Vitamin D, Total (25-Hydroxy) 48.1 30.0 - 80.0 ng/mL    Narrative    Deficiency <10.0 ng/mL  Insufficiency 10.0-29.9 ng/mL  Sufficiency 30.0-80.0 ng/mL  Toxicity (possible) >100.0 ng/mL   Lipid Cascade FASTING   Result Value Ref Range    Cholesterol 304 (H) <=199 mg/dL    Triglycerides 89 <=149 mg/dL    HDL Cholesterol 86 >=50 mg/dL    LDL Calculated 200 (H) <=129 mg/dL    Patient Fasting > 8hrs? Yes    Glucose   Result Value Ref Range    Glucose 85 70 - 125 mg/dL    Patient Fasting > 8hrs? Yes     Narrative    Fasting Glucose reference range is 70-99 mg/dL per  American Diabetes Association (ADA) guidelines.   Hemoglobin   Result Value Ref Range    Hemoglobin 13.7 12.0 - 16.0 g/dL       Your hemoglobin is normal.  Your fasting glucose is normal.  Your vitamin D is normal.  Please continue your current vitamin D supplement.  Your cholesterol levels are quite elevated.  I recommend intensive lifestyle changes including dietary changes, regular exercise and weight loss.  I would recommend a recheck of your cholesterol in 3 to 6 months.  This can be a lab only visit.  If cholesterol levels are not improved with intensive lifestyle changes, a cholesterol-lowering medication would be recommended.    Please call with questions or contact us using [facility].    Sincerely,        Electronically signed by Daisy Jackson MD

## 2021-08-08 DIAGNOSIS — F41.9 ANXIETY: ICD-10-CM

## 2021-08-10 RX ORDER — HYDROXYZINE HYDROCHLORIDE 25 MG/1
TABLET, FILM COATED ORAL
Qty: 180 TABLET | Refills: 2 | Status: SHIPPED | OUTPATIENT
Start: 2021-08-10 | End: 2022-07-06

## 2021-08-10 NOTE — TELEPHONE ENCOUNTER
"Last Written Prescription Date:  7/29/20  Last Fill Quantity: 180,  # refills: 3   Last office visit provider:  4/26/21     Requested Prescriptions   Pending Prescriptions Disp Refills     hydrOXYzine (ATARAX) 25 MG tablet [Pharmacy Med Name: HYDROXYZINE HCL 25 MG TABLET] 180 tablet 3     Sig: TAKE 1 TO 2 TABLETS BY MOUTH AT BEDTIME AS NEEDED       Antihistamines Protocol Passed - 8/8/2021  7:49 AM        Passed - Recent (12 mo) or future (30 days) visit within the authorizing provider's specialty     Patient has had an office visit with the authorizing provider or a provider within the authorizing providers department within the previous 12 mos or has a future within next 30 days. See \"Patient Info\" tab in inbasket, or \"Choose Columns\" in Meds & Orders section of the refill encounter.              Passed - Patient is age 3 or older     Apply age and/or weight-based dosing for peds patients age 3 and older.    Forward request to provider for patients under the age of 3.          Passed - Medication is active on med list             Tony Knapp RN 08/10/21 2:17 PM  "

## 2022-06-21 ENCOUNTER — HOSPITAL ENCOUNTER (OUTPATIENT)
Dept: MAMMOGRAPHY | Facility: CLINIC | Age: 62
Discharge: HOME | End: 2022-06-21
Attending: FAMILY MEDICINE | Admitting: FAMILY MEDICINE
Payer: COMMERCIAL

## 2022-06-21 DIAGNOSIS — Z12.31 VISIT FOR SCREENING MAMMOGRAM: ICD-10-CM

## 2022-06-21 PROCEDURE — 77067 SCR MAMMO BI INCL CAD: CPT

## 2022-07-06 ENCOUNTER — OFFICE VISIT (OUTPATIENT)
Dept: FAMILY MEDICINE | Facility: CLINIC | Age: 62
End: 2022-07-06
Payer: COMMERCIAL

## 2022-07-06 VITALS
WEIGHT: 153.2 LBS | DIASTOLIC BLOOD PRESSURE: 60 MMHG | BODY MASS INDEX: 25.52 KG/M2 | SYSTOLIC BLOOD PRESSURE: 110 MMHG | TEMPERATURE: 98.1 F | OXYGEN SATURATION: 98 % | HEART RATE: 67 BPM | HEIGHT: 65 IN

## 2022-07-06 DIAGNOSIS — Z23 HIGH PRIORITY FOR 2019-NCOV VACCINE: ICD-10-CM

## 2022-07-06 DIAGNOSIS — E66.3 OVERWEIGHT (BMI 25.0-29.9): ICD-10-CM

## 2022-07-06 DIAGNOSIS — Z00.00 ROUTINE HISTORY AND PHYSICAL EXAMINATION OF ADULT: Primary | ICD-10-CM

## 2022-07-06 DIAGNOSIS — M85.80 OSTEOPENIA, UNSPECIFIED LOCATION: ICD-10-CM

## 2022-07-06 DIAGNOSIS — E78.2 MIXED HYPERLIPIDEMIA: ICD-10-CM

## 2022-07-06 DIAGNOSIS — Z11.59 SCREENING FOR VIRAL DISEASE: ICD-10-CM

## 2022-07-06 LAB
ALBUMIN SERPL BCG-MCNC: 5 G/DL (ref 3.5–5.2)
ALP SERPL-CCNC: 97 U/L (ref 35–104)
ALT SERPL W P-5'-P-CCNC: 30 U/L (ref 10–35)
ANION GAP SERPL CALCULATED.3IONS-SCNC: 10 MMOL/L (ref 7–15)
AST SERPL W P-5'-P-CCNC: 25 U/L (ref 10–35)
BILIRUB SERPL-MCNC: 0.4 MG/DL
BUN SERPL-MCNC: 8.6 MG/DL (ref 8–23)
CALCIUM SERPL-MCNC: 9.9 MG/DL (ref 8.8–10.2)
CHLORIDE SERPL-SCNC: 99 MMOL/L (ref 98–107)
CHOLEST SERPL-MCNC: 321 MG/DL
CREAT SERPL-MCNC: 0.7 MG/DL (ref 0.51–0.95)
DEPRECATED HCO3 PLAS-SCNC: 27 MMOL/L (ref 22–29)
GFR SERPL CREATININE-BSD FRML MDRD: >90 ML/MIN/1.73M2
GLUCOSE SERPL-MCNC: 99 MG/DL (ref 70–99)
HDLC SERPL-MCNC: 100 MG/DL
HGB BLD-MCNC: 14 G/DL (ref 11.7–15.7)
LDLC SERPL CALC-MCNC: 199 MG/DL
NONHDLC SERPL-MCNC: 221 MG/DL
POTASSIUM SERPL-SCNC: 3.9 MMOL/L (ref 3.4–5.3)
PROT SERPL-MCNC: 7.4 G/DL (ref 6.4–8.3)
SODIUM SERPL-SCNC: 136 MMOL/L (ref 136–145)
TRIGL SERPL-MCNC: 109 MG/DL

## 2022-07-06 PROCEDURE — 0064A COVID-19,PF,MODERNA (18+ YRS BOOSTER .25ML): CPT | Performed by: FAMILY MEDICINE

## 2022-07-06 PROCEDURE — 80061 LIPID PANEL: CPT | Performed by: FAMILY MEDICINE

## 2022-07-06 PROCEDURE — 85018 HEMOGLOBIN: CPT | Performed by: FAMILY MEDICINE

## 2022-07-06 PROCEDURE — 36415 COLL VENOUS BLD VENIPUNCTURE: CPT | Performed by: FAMILY MEDICINE

## 2022-07-06 PROCEDURE — 2894A POLIOVIRUS ANTIBODIES: CPT | Mod: 90 | Performed by: FAMILY MEDICINE

## 2022-07-06 PROCEDURE — 91306 COVID-19,PF,MODERNA (18+ YRS BOOSTER .25ML): CPT | Performed by: FAMILY MEDICINE

## 2022-07-06 PROCEDURE — 80053 COMPREHEN METABOLIC PANEL: CPT | Performed by: FAMILY MEDICINE

## 2022-07-06 PROCEDURE — 82306 VITAMIN D 25 HYDROXY: CPT | Performed by: FAMILY MEDICINE

## 2022-07-06 PROCEDURE — 99000 SPECIMEN HANDLING OFFICE-LAB: CPT | Performed by: FAMILY MEDICINE

## 2022-07-06 PROCEDURE — 86658 ENTEROVIRUS ANTIBODY: CPT | Mod: 90 | Performed by: FAMILY MEDICINE

## 2022-07-06 PROCEDURE — 99396 PREV VISIT EST AGE 40-64: CPT | Mod: 25 | Performed by: FAMILY MEDICINE

## 2022-07-06 ASSESSMENT — ENCOUNTER SYMPTOMS
JOINT SWELLING: 0
HEADACHES: 0
NAUSEA: 0
HEARTBURN: 0
DIARRHEA: 0
FREQUENCY: 0
SHORTNESS OF BREATH: 0
ABDOMINAL PAIN: 0
HEMATOCHEZIA: 0
NERVOUS/ANXIOUS: 0
ARTHRALGIAS: 0
MYALGIAS: 0
WEAKNESS: 0
EYE PAIN: 0
CONSTIPATION: 0
DYSURIA: 0
DIZZINESS: 0
PALPITATIONS: 0
HEMATURIA: 0
FEVER: 0
BREAST MASS: 0
COUGH: 0
PARESTHESIAS: 0
SORE THROAT: 0
CHILLS: 0

## 2022-07-06 NOTE — LETTER
July 11, 2022      Shawna Powell  [address]  [place] [address]        Dear ,    We are writing to inform you of your test results.    Hemoglobin and vitamin D levels are normal.  Your blood sugar is normal. Your kidney and liver function tests are normal. Your cholesterol levels have improved a little but are still very high. I suggest doing a CT coronary calcium score as we discussed to get a better idea of your risk for heart disease. If you would like to do this, please let me know and I will place an order. Please continue to follow the healthy lifestyle changes you have made over the past year. Thank you for coming in!    Resulted Orders   Lipid panel reflex to direct LDL Fasting   Result Value Ref Range    Cholesterol 321 (H) <200 mg/dL    Triglycerides 109 <150 mg/dL    Direct Measure  >=50 mg/dL    LDL Cholesterol Calculated 199 (H) <=100 mg/dL    Non HDL Cholesterol 221 (H) <130 mg/dL    Narrative    Cholesterol  Desirable:  <200 mg/dL    Triglycerides  Normal:  Less than 150 mg/dL  Borderline High:  150-199 mg/dL  High:  200-499 mg/dL  Very High:  Greater than or equal to 500 mg/dL    Direct Measure HDL  Female:  Greater than or equal to 50 mg/dL   Male:  Greater than or equal to 40 mg/dL    LDL Cholesterol  Desirable:  <100mg/dL  Above Desirable:  100-129 mg/dL   Borderline High:  130-159 mg/dL   High:  160-189 mg/dL   Very High:  >= 190 mg/dL    Non HDL Cholesterol  Desirable:  130 mg/dL  Above Desirable:  130-159 mg/dL  Borderline High:  160-189 mg/dL  High:  190-219 mg/dL  Very High:  Greater than or equal to 220 mg/dL   Comprehensive metabolic panel (BMP + Alb, Alk Phos, ALT, AST, Total. Bili, TP)   Result Value Ref Range    Sodium 136 136 - 145 mmol/L    Potassium 3.9 3.4 - 5.3 mmol/L    Creatinine 0.70 0.51 - 0.95 mg/dL    Urea Nitrogen 8.6 8.0 - 23.0 mg/dL    Chloride 99 98 - 107 mmol/L    Carbon Dioxide (CO2) 27 22 - 29 mmol/L    Anion Gap 10 7 - 15 mmol/L     Pt BP of 230/106. Pt AAOx4 and appropriate at this time. Respirations even and unlabored. No acute distress noted. MD aware.    Glucose 99 70 - 99 mg/dL    Calcium 9.9 8.8 - 10.2 mg/dL    Protein Total 7.4 6.4 - 8.3 g/dL    Albumin 5.0 3.5 - 5.2 g/dL    Bilirubin Total 0.4 <=1.2 mg/dL    Alkaline Phosphatase 97 35 - 104 U/L    AST 25 10 - 35 U/L    ALT 30 10 - 35 U/L    GFR Estimate >90 >60 mL/min/1.73m2      Comment:      Effective December 21, 2021 eGFRcr in adults is calculated using the 2021 CKD-EPI creatinine equation which includes age and gender (Lucía et al., NEJM, DOI: 10.1056/WGGDaj9793435)   Vitamin D Deficiency   Result Value Ref Range    Vitamin D, Total (25-Hydroxy) 67 20 - 75 ug/L    Narrative    Season, race, dietary intake, and treatment affect the concentration of 25-hydroxy-Vitamin D. Values may decrease during winter months and increase during summer months. Values 20-29 ug/L may indicate Vitamin D insufficiency and values <20 ug/L may indicate Vitamin D deficiency.    Vitamin D determination is routinely performed by an immunoassay specific for 25 hydroxyvitamin D3.  If an individual is on vitamin D2(ergocalciferol) supplementation, please specify 25 OH vitamin D2 and D3 level determination by LCMSMS test [id number].     Hemoglobin   Result Value Ref Range    Hemoglobin 14.0 11.7 - 15.7 g/dL       If you have any questions or concerns, please call the clinic at the number listed above.       Sincerely,      Daisy Jackson MD

## 2022-07-06 NOTE — LETTER
July 12, 2022      Shawnajosh Powell  [address]  [place] [address]        Dear ,    We are writing to inform you of your test results.    Your antibody titers show immunity to polio.    Resulted Orders   Lipid panel reflex to direct LDL Fasting   Result Value Ref Range    Cholesterol 321 (H) <200 mg/dL    Triglycerides 109 <150 mg/dL    Direct Measure  >=50 mg/dL    LDL Cholesterol Calculated 199 (H) <=100 mg/dL    Non HDL Cholesterol 221 (H) <130 mg/dL    Narrative    Cholesterol  Desirable:  <200 mg/dL    Triglycerides  Normal:  Less than 150 mg/dL  Borderline High:  150-199 mg/dL  High:  200-499 mg/dL  Very High:  Greater than or equal to 500 mg/dL    Direct Measure HDL  Female:  Greater than or equal to 50 mg/dL   Male:  Greater than or equal to 40 mg/dL    LDL Cholesterol  Desirable:  <100mg/dL  Above Desirable:  100-129 mg/dL   Borderline High:  130-159 mg/dL   High:  160-189 mg/dL   Very High:  >= 190 mg/dL    Non HDL Cholesterol  Desirable:  130 mg/dL  Above Desirable:  130-159 mg/dL  Borderline High:  160-189 mg/dL  High:  190-219 mg/dL  Very High:  Greater than or equal to 220 mg/dL   Comprehensive metabolic panel (BMP + Alb, Alk Phos, ALT, AST, Total. Bili, TP)   Result Value Ref Range    Sodium 136 136 - 145 mmol/L    Potassium 3.9 3.4 - 5.3 mmol/L    Creatinine 0.70 0.51 - 0.95 mg/dL    Urea Nitrogen 8.6 8.0 - 23.0 mg/dL    Chloride 99 98 - 107 mmol/L    Carbon Dioxide (CO2) 27 22 - 29 mmol/L    Anion Gap 10 7 - 15 mmol/L    Glucose 99 70 - 99 mg/dL    Calcium 9.9 8.8 - 10.2 mg/dL    Protein Total 7.4 6.4 - 8.3 g/dL    Albumin 5.0 3.5 - 5.2 g/dL    Bilirubin Total 0.4 <=1.2 mg/dL    Alkaline Phosphatase 97 35 - 104 U/L    AST 25 10 - 35 U/L    ALT 30 10 - 35 U/L    GFR Estimate >90 >60 mL/min/1.73m2      Comment:      Effective December 21, 2021 eGFRcr in adults is calculated using the 2021 CKD-EPI creatinine equation which includes age and gender (Lucía et al., NEJ, DOI:  10.1056/XISTmk7970461)   Vitamin D Deficiency   Result Value Ref Range    Vitamin D, Total (25-Hydroxy) 67 20 - 75 ug/L    Narrative    Season, race, dietary intake, and treatment affect the concentration of 25-hydroxy-Vitamin D. Values may decrease during winter months and increase during summer months. Values 20-29 ug/L may indicate Vitamin D insufficiency and values <20 ug/L may indicate Vitamin D deficiency.    Vitamin D determination is routinely performed by an immunoassay specific for 25 hydroxyvitamin D3.  If an individual is on vitamin D2(ergocalciferol) supplementation, please specify 25 OH vitamin D2 and D3 level determination by LCMSMS test [id number].     Hemoglobin   Result Value Ref Range    Hemoglobin 14.0 11.7 - 15.7 g/dL   Poliovirus Antibodies   Result Value Ref Range    Poliovirus Elsa Type 1 1:40     Poliovirus Elsa Type 3 1:40       Comment:      INTERPRETIVE INFORMATION: Poliovirus (Types 1, 3) Antibodies    Less than 1:10:  No detectable poliovirus antibodies.  1:10 or greater:  Antibody to poliovirus detected, which   may represent prior immunization or current or past   infection.    The presence of neutralizing antibodies against poliovirus   implies immunity. The serum neutralization test is serotype   specific. Antibodies against one type does not indicate   immunity against the other type.    Reference interval applies to Poliovirus Antibodies Types 1   and 3.  Performed By: [facility]  [address] [place] [id number]  : Finesse Blount MD, PhD       If you have any questions or concerns, please call the clinic at the number listed above.       Sincerely,      Daisy Jackson MD

## 2022-07-06 NOTE — PROGRESS NOTES
SUBJECTIVE:   CC: Shawna Powell is an 62 year old woman who presents for preventive health visit.     Reviewed her health history including medications and allergies.  No significant changes in health since last visit.  Currently taking a vitamin D supplement.  Has history of low bone density.  Plans to schedule bone density scan.  Has been exercising regularly.  Lost 12 pounds since her last physical.  Has modified her diet.  She has history of hyperlipidemia.  Fasting today for labs.  Wishes to avoid medications for treatment of hyperlipidemia.  Recently had mammogram and yearly gynecology visit.  Does not need breast or pelvic exam today.  She would like Moderna COVID booster.  She has concerns about whether she needs a polio booster.  States that she heard about a recent case of polio.  Pulled the tick off of her back about 4 days ago.  It was not engorged or attached for more than a few hours.  Has concerns about possible Lyme's disease.  Not experiencing any symptoms of rash or flulike symptoms.  Review of systems is otherwise negative.  Recently had pterygium removed from left eye.  Gets regular vision and dental exams.      Patient has been advised of split billing requirements and indicates understanding: Yes  Healthy Habits:     Getting at least 3 servings of Calcium per day:  Yes    Bi-annual eye exam:  Yes    Dental care twice a year:  Yes    Sleep apnea or symptoms of sleep apnea:  None    Diet:  Regular (no restrictions)    Frequency of exercise:  4-5 days/week    Duration of exercise:  15-30 minutes    Taking medications regularly:  Not Applicable    Medication side effects:  None    PHQ-2 Total Score: 0    Additional concerns today:  No              Today's PHQ-2 Score:   PHQ-2 ( 1999 Pfizer) 7/6/2022   Q1: Little interest or pleasure in doing things 0   Q2: Feeling down, depressed or hopeless 0   PHQ-2 Score 0   Q1: Little interest or pleasure in doing things Not at all   Q2: Feeling down,  depressed or hopeless Not at all   PHQ-2 Score 0       Abuse: Current or Past (Physical, Sexual or Emotional) - No  Do you feel safe in your environment? Yes    Have you ever done Advance Care Planning? (For example, a Health Directive, POLST, or a discussion with a medical provider or your loved ones about your wishes): No, advance care planning information given to patient to review.  Patient declined advance care planning discussion at this time.    Social History     Tobacco Use     Smoking status: Never Smoker     Smokeless tobacco: Never Used   Substance Use Topics     Alcohol use: No         Alcohol Use 7/6/2022   Prescreen: >3 drinks/day or >7 drinks/week? Not Applicable       Reviewed orders with patient.  Reviewed health maintenance and updated orders accordingly - Yes  Current Outpatient Medications   Medication Sig Dispense Refill     cholecalciferol, vitamin D3, (VITAMIN D3) 50 mcg (2,000 unit) capsule [CHOLECALCIFEROL, VITAMIN D3, (VITAMIN D3) 50 MCG (2,000 UNIT) CAPSULE] Take 3 capsules (3000 international unit(s)) daily (Patient taking differently: No sig reported)  0       Breast Cancer Screening:    Breast CA Risk Assessment (FHS-7) 7/6/2022   Do you have a family history of breast, colon, or ovarian cancer? No / Unknown         Mammogram Screening: Recommended mammography every 1-2 years with patient discussion and risk factor consideration  Pertinent mammograms are reviewed under the imaging tab.    History of abnormal Pap smear: NO - age 30-65 PAP every 5 years with negative HPV co-testing recommended  PAP / HPV 4/24/2015   PAP Negative for squamous intraepithelial lesion or malignancy  Electronically signed by Tess Brand CT (ASCP) on 4/30/2015 at  1:10 PM       Reviewed and updated as needed this visit by clinical staff   Tobacco  Allergies  Meds   Med Hx  Surg Hx  Fam Hx            Reviewed and updated as needed this visit by Provider   Tobacco  Allergies    Med Hx  Surg Hx   "Fam Hx               Review of Systems   Constitutional: Negative for chills and fever.   HENT: Negative for congestion, ear pain, hearing loss and sore throat.    Eyes: Negative for pain and visual disturbance.   Respiratory: Negative for cough and shortness of breath.    Cardiovascular: Negative for chest pain, palpitations and peripheral edema.   Gastrointestinal: Negative for abdominal pain, constipation, diarrhea, heartburn, hematochezia and nausea.   Breasts:  Negative for tenderness, breast mass and discharge.   Genitourinary: Negative for dysuria, frequency, genital sores, hematuria, pelvic pain, urgency, vaginal bleeding and vaginal discharge.   Musculoskeletal: Negative for arthralgias, joint swelling and myalgias.   Skin: Negative for rash.   Neurological: Negative for dizziness, weakness, headaches and paresthesias.   Psychiatric/Behavioral: Negative for mood changes. The patient is not nervous/anxious.           OBJECTIVE:   /60 (BP Location: Right arm, Cuff Size: Adult Regular)   Pulse 67   Temp 98.1  F (36.7  C) (Oral)   Ht 1.657 m (5' 5.25\")   Wt 69.5 kg (153 lb 3.2 oz)   SpO2 98%   BMI 25.30 kg/m    Physical Exam  GENERAL APPEARANCE: healthy, alert and no distress  EYES: Eyes grossly normal to inspection, PERRL and conjunctivae and sclerae normal  HENT: ear canals and TM's normal  RESP: lungs clear to auscultation - no rales, rhonchi or wheezes  CV: regular rate and rhythm, normal S1 S2, no S3 or S4, no murmur, click or rub, no peripheral edema and peripheral pulses strong  ABDOMEN: soft, nontender, no hepatosplenomegaly, no masses and bowel sounds normal  MS: no musculoskeletal defects are noted and gait is age appropriate without ataxia  SKIN: no suspicious lesions or rashes  NEURO: Normal strength and tone, sensory exam grossly normal, mentation intact and speech normal  PSYCH: mentation appears normal and affect normal/bright        ASSESSMENT/PLAN:   Shawna was seen today for " physical and imm/inj.    Diagnoses and all orders for this visit:    Routine history and physical examination of adult  -     Hemoglobin; Future  -     Hemoglobin  -She is up-to-date on age-appropriate cancer screening.  Continue with regular exercise and healthy diet.  Continue with regular vision and dental exams.  Follow-up in 1 year for annual physical    Osteopenia, unspecified location  -     Vitamin D Deficiency; Future  -     Vitamin D Deficiency  - Continue adequate calcium and vitamin D intake and regular weightbearing exercise.  She will schedule bone density scan    Overweight (BMI 25.0-29.9)  Continue with dietary modifications and regular exercise    Mixed hyperlipidemia  -     Lipid panel reflex to direct LDL Fasting; Future  -     Comprehensive metabolic panel (BMP + Alb, Alk Phos, ALT, AST, Total. Bili, TP); Future  -     Lipid panel reflex to direct LDL Fasting  -     Comprehensive metabolic panel (BMP + Alb, Alk Phos, ALT, AST, Total. Bili, TP)  - Not interested in lipid-lowering medication.  Check lipids and comprehensive metabolic panel today.  Continue dietary modifications and regular exercise and weight management.  Could consider CT coronary calcium score for further risk stratification in future    Screening for viral disease  -     Poliovirus Antibodies; Future  -     Poliovirus Antibodies    High priority for 2019-nCoV vaccine  -     COVID-19,PF,MODERNA (18+ Yrs BOOSTER .25mL)    Other orders  -     REVIEW OF HEALTH MAINTENANCE PROTOCOL ORDERS  -     PRIMARY CARE FOLLOW-UP SCHEDULING; Future    Tick bite  Provided reassurance.  Tick was not attached for over 72 hours.  Do not feel antibiotic prophylaxis is indicated.  She wonders if Lyme's testing is needed.  At this point since tick bite was a few days ago, feel it would be too early to test and she is asymptomatic.  She was directed to contact clinic if she develops any symptoms of concern and we can arrange for  "testing.        COUNSELING:  Reviewed preventive health counseling, as reflected in patient instructions    Estimated body mass index is 25.3 kg/m  as calculated from the following:    Height as of this encounter: 1.657 m (5' 5.25\").    Weight as of this encounter: 69.5 kg (153 lb 3.2 oz).    Weight management plan: Discussed healthy diet and exercise guidelines    She reports that she has never smoked. She has never used smokeless tobacco.      Counseling Resources:  ATP IV Guidelines  Pooled Cohorts Equation Calculator  Breast Cancer Risk Calculator  BRCA-Related Cancer Risk Assessment: FHS-7 Tool  FRAX Risk Assessment  ICSI Preventive Guidelines  Dietary Guidelines for Americans, 2010  USDA's MyPlate  ASA Prophylaxis  Lung CA Screening    Daisy Jackson MD  [facility]  "

## 2022-07-07 LAB — DEPRECATED CALCIDIOL+CALCIFEROL SERPL-MC: 67 UG/L (ref 20–75)

## 2022-07-12 LAB
PV1 NAB TITR SER NT: NORMAL {TITER}
PV3 NAB TITR SER NT: NORMAL {TITER}

## 2022-07-18 ENCOUNTER — TELEPHONE (OUTPATIENT)
Dept: FAMILY MEDICINE | Facility: CLINIC | Age: 62
End: 2022-07-18

## 2022-07-18 DIAGNOSIS — Z13.6 SCREENING FOR HEART DISEASE: ICD-10-CM

## 2022-07-18 DIAGNOSIS — E78.2 MIXED HYPERLIPIDEMIA: Primary | ICD-10-CM

## 2022-07-18 NOTE — TELEPHONE ENCOUNTER
Reason for call:  Other     Patient called regarding (reason for call): CT order    Additional comments: Pt would like to proceed with getting CT coronary calcium score.    Phone number to reach patient:  Cell number on file:    Telephone Information:   Mobile [phone]       Best Time:  Any    Can we leave a detailed message on this number?  YES

## 2022-07-27 ENCOUNTER — ANCILLARY PROCEDURE (OUTPATIENT)
Dept: BONE DENSITY | Facility: CLINIC | Age: 62
End: 2022-07-27
Attending: OBSTETRICS & GYNECOLOGY
Payer: COMMERCIAL

## 2022-07-27 DIAGNOSIS — M85.80 OSTEOPENIA: ICD-10-CM

## 2022-07-27 PROCEDURE — 77080 DXA BONE DENSITY AXIAL: CPT | Mod: TC | Performed by: RADIOLOGY

## 2022-07-28 ENCOUNTER — HOSPITAL ENCOUNTER (OUTPATIENT)
Dept: CT IMAGING | Facility: CLINIC | Age: 62
Discharge: HOME | End: 2022-07-28
Attending: FAMILY MEDICINE | Admitting: FAMILY MEDICINE

## 2022-07-28 DIAGNOSIS — Z13.6 SCREENING FOR HEART DISEASE: ICD-10-CM

## 2022-07-28 DIAGNOSIS — E78.2 MIXED HYPERLIPIDEMIA: ICD-10-CM

## 2022-07-28 LAB
CV CALCIUM SCORE AGATSTON LM: 0
CV CALCIUM SCORING AGATSON LAD: 0
CV CALCIUM SCORING AGATSTON CX: 0
CV CALCIUM SCORING AGATSTON RCA: 0
CV CALCIUM SCORING AGATSTON TOTAL: 0

## 2022-07-28 PROCEDURE — 75571 CT HRT W/O DYE W/CA TEST: CPT

## 2022-07-28 PROCEDURE — 75571 CT HRT W/O DYE W/CA TEST: CPT | Mod: 26 | Performed by: INTERNAL MEDICINE

## 2022-07-29 ENCOUNTER — TELEPHONE (OUTPATIENT)
Dept: FAMILY MEDICINE | Facility: CLINIC | Age: 62
End: 2022-07-29

## 2022-07-29 NOTE — TELEPHONE ENCOUNTER
----- Message from Daisy Jackson MD sent at 7/29/2022 12:01 PM CDT -----  Please let patient know her calcium score is 0.  This implies a low risk of cardiac events in the next 10 years.  I think it is okay for her to remain off of cholesterol-lowering medication.  She should continue to follow the healthy lifestyle changes that she has been making.

## 2022-08-19 ENCOUNTER — OFFICE VISIT (OUTPATIENT)
Dept: INTERNAL MEDICINE | Facility: CLINIC | Age: 62
End: 2022-08-19
Payer: COMMERCIAL

## 2022-08-19 VITALS
OXYGEN SATURATION: 96 % | SYSTOLIC BLOOD PRESSURE: 114 MMHG | BODY MASS INDEX: 25.12 KG/M2 | DIASTOLIC BLOOD PRESSURE: 64 MMHG | HEART RATE: 66 BPM | HEIGHT: 66 IN | WEIGHT: 156.3 LBS

## 2022-08-19 DIAGNOSIS — M85.80 LOW BONE MASS: Primary | ICD-10-CM

## 2022-08-19 PROCEDURE — 99213 OFFICE O/P EST LOW 20 MIN: CPT | Performed by: INTERNAL MEDICINE

## 2022-08-19 NOTE — PROGRESS NOTES
[facility] Follow Up Note    Assessment/Plan:  1. Low bone mass  Here for follow-up of bone densitometry.  She continues to have low bone mass with low FRAX.  However, bone loss is noted from 2019.  Recommendations: Continue calcium and vitamin D as you are doing.  Get back on track with the weightbearing and balance activity with a higher level as in the past.    Follow-up bone mineral density in 2 years        Rand Love MD, MD    Chief Complaint:  Chief Complaint   Patient presents with     Follow Up       History of Present Illness:  Shawna is a 62 year old female with whom I met in 2017 for discussion of bone health.  At that time, she has had low bone mass with low FRAX.  We have followed her since.  She continues to do well with regard to calcium and vitamin D supplementation.  Her exercise has fallen off somewhat with the pandemic.  She was very vigilant with multiple classes that impacted balance, weightbearing/etc.  Most of these were canceled.  [place] decreased.  Of note, her weight has been stable.    The bone mineral density is reviewed with her.  Although she still is with low bone mass with low FRAX, she has had some bone loss.    Review of Systems:  A comprehensive review of systems was performed and was otherwise negative    PFSH:  Social History: She is single.  [place]  History   Smoking Status     Never Smoker   Smokeless Tobacco     Never Used       Past History:   Current Outpatient Medications   Medication Sig Dispense Refill     cholecalciferol, vitamin D3, (VITAMIN D3) 50 mcg (2,000 unit) capsule [CHOLECALCIFEROL, VITAMIN D3, (VITAMIN D3) 50 MCG (2,000 UNIT) CAPSULE] Take 3 capsules (3000 international unit(s)) daily (Patient taking differently: No sig reported)  0       Family History:     Physical Exam:  General Appearance:   She appears quite well and in no acute distress  Vitals:    08/19/22 0756   BP: 114/64   BP  "Location: Left arm   Patient Position: Sitting   Cuff Size: Adult Regular   Pulse: 66   SpO2: 96%   Weight: 70.9 kg (156 lb 4.8 oz)   Height: 1.676 m (5' 6\")     Wt Readings from Last 3 Encounters:   08/19/22 70.9 kg (156 lb 4.8 oz)   07/06/22 69.5 kg (153 lb 3.2 oz)   04/26/21 74.8 kg (165 lb)     Body mass index is 25.23 kg/m .        Answers for HPI/ROS submitted by the patient on 8/19/2022  What is the reason for your visit today? : Bone density scan analysis  How many servings of fruits and vegetables do you eat daily?: 4 or more  On average, how many sweetened beverages do you drink each day (Examples: soda, juice, sweet tea, etc.  Do NOT count diet or artificially sweetened beverages)?: 0  How many minutes a day do you exercise enough to make your heart beat faster?: 10 to 19  How many days a week do you exercise enough to make your heart beat faster?: 7  How many days per week do you miss taking your medication?: 0      "

## 2022-09-16 ENCOUNTER — TRANSFERRED RECORDS (OUTPATIENT)
Dept: HEALTH INFORMATION MANAGEMENT | Facility: CLINIC | Age: 62
End: 2022-09-16

## 2023-04-12 ENCOUNTER — OFFICE VISIT (OUTPATIENT)
Dept: FAMILY MEDICINE | Facility: CLINIC | Age: 63
End: 2023-04-12
Payer: COMMERCIAL

## 2023-04-12 VITALS
WEIGHT: 160.1 LBS | RESPIRATION RATE: 20 BRPM | HEIGHT: 65 IN | DIASTOLIC BLOOD PRESSURE: 80 MMHG | OXYGEN SATURATION: 98 % | BODY MASS INDEX: 26.67 KG/M2 | SYSTOLIC BLOOD PRESSURE: 138 MMHG | TEMPERATURE: 97.3 F | HEART RATE: 72 BPM

## 2023-04-12 DIAGNOSIS — G45.3 AF (AMAUROSIS FUGAX): Primary | ICD-10-CM

## 2023-04-12 LAB — ERYTHROCYTE [SEDIMENTATION RATE] IN BLOOD BY WESTERGREN METHOD: 13 MM/HR (ref 0–30)

## 2023-04-12 PROCEDURE — 99213 OFFICE O/P EST LOW 20 MIN: CPT | Performed by: FAMILY MEDICINE

## 2023-04-12 PROCEDURE — 85652 RBC SED RATE AUTOMATED: CPT | Performed by: FAMILY MEDICINE

## 2023-04-12 PROCEDURE — 36415 COLL VENOUS BLD VENIPUNCTURE: CPT | Performed by: FAMILY MEDICINE

## 2023-04-12 PROCEDURE — 86140 C-REACTIVE PROTEIN: CPT | Performed by: FAMILY MEDICINE

## 2023-04-12 RX ORDER — AMOXICILLIN 500 MG/1
CAPSULE ORAL
COMMUNITY
Start: 2023-04-06 | End: 2023-07-06

## 2023-04-12 ASSESSMENT — PAIN SCALES - GENERAL: PAINLEVEL: NO PAIN (0)

## 2023-04-12 NOTE — LETTER
April 13, 2023      Shawna Powell  [address] [place] [address]        Dear Ms.Powell,    We are writing to inform you of your test results.    Inflammatory markers are normal and not elevated    Resulted Orders   ESR: Erythrocyte sedimentation rate   Result Value Ref Range    Erythrocyte Sedimentation Rate 13 0 - 30 mm/hr   CRP, inflammation   Result Value Ref Range    CRP Inflammation <3.00 <5.00 mg/L       If you have any questions or concerns, please call the clinic at the number listed above.       Sincerely,      Daisy Jackson MD

## 2023-04-13 LAB — CRP SERPL-MCNC: <3 MG/L

## 2023-04-13 NOTE — PROGRESS NOTES
"  Assessment & Plan     AF (amaurosis fugax)  We will check sed rate and C-reactive protein to evaluate for possible underlying temporal arteritis.  Will obtain carotid ultrasound, echocardiogram as well as MRI/MRA of the brain.  Further recommendations will be made once results are available.  - ESR: Erythrocyte sedimentation rate  - CRP, inflammation  - US Carotid Bilateral  - Echocardiogram Complete  - MRA Brain ([place] [facility] [place] [facility] [place]) wo & w Contrast  - MR Brain w/o Contrast  - ESR: Erythrocyte sedimentation rate  - CRP, inflammation               BMI:   Estimated body mass index is 26.64 kg/m  as calculated from the following:    Height as of this encounter: 1.651 m (5' 5\").    Weight as of this encounter: 72.6 kg (160 lb 1.6 oz).   Weight management plan: Discussed healthy diet and exercise guidelines        Daisy Jackson MD  [facility] is a 62 year old, presenting for the following health issues:  Eye Problem (Vision issues. April 1st-her left eye blurred after a yoga move. Also had a blood spot on iris on left eye. Then went away. Has occular migraine. No pain with the loss of the vision on the left eye. Saw eye doctor on April 6th and found nothing. Was told to see her primary to see about other testing. Also has a tooth that had root canal and crown and was hurting for several weeks and saw the dentist the same day the eye problem started. Not sure if the eye issue is from the tooth.)        8/19/2022     7:56 AM   Additional Questions   Roomed by kwesi     HPI   She is seen today for evaluation of an episode of loss of vision in her left eye.  This occurred on April 1.  She had been performing yoga and was in a forward bent position.  She twisted to the left and then had a sensation of a curtain coming over her left eye.  She was not able to see out of her left eye but was able to sense light.  She has a history of ocular migraines and was not sure if this is what " "she was experiencing.  She lay down on her back and rested and symptoms resolved after about an hour.  She reports that she did not feel great that day and had not been feeling great for several days before this episode.  She has been dealing with an infection in her tooth on the left side.  She has been seeing her dentist and is now on amoxicillin.  She is feeling better but still has 2 days left.  She did go see her eye doctor regarding this loss of vision and was told that everything is okay.  Was told to see her PCP for further evaluation.  She has not had recurrence of symptoms.  She denies other symptoms of concern.  She has no other concerns or questions today          Review of Systems         Objective    /80   Pulse 72   Temp 97.3  F (36.3  C) (Temporal)   Resp 20   Ht 1.651 m (5' 5\")   Wt 72.6 kg (160 lb 1.6 oz)   SpO2 98%   BMI 26.64 kg/m    Body mass index is 26.64 kg/m .  Physical Exam   GENERAL: healthy, alert and no distress  EYES: Eyes grossly normal to inspection, PERRL and conjunctivae and sclerae normal  RESP: lungs clear to auscultation - no rales, rhonchi or wheezes  CV: regular rates and rhythm, normal S1 S2, no S3 or S4, no murmur, click or rub, no peripheral edema and no bruits heard bilateral carotids  MS: no gross musculoskeletal defects noted, no edema  PSYCH: mentation appears normal, affect normal/bright                    "

## 2023-04-18 ENCOUNTER — NURSE TRIAGE (OUTPATIENT)
Dept: NURSING | Facility: CLINIC | Age: 63
End: 2023-04-18
Payer: COMMERCIAL

## 2023-04-18 NOTE — TELEPHONE ENCOUNTER
Patient called back.  RN gave her the results per the letter sent 4/13/23.  She has the other test scheduled.    Patient would like to update Dr. Jackson:  -she is feeling well and exercising  -no headache other eye events  -not feeling any cardio stress  (FYI only, patient dose not need a call back)    ERENDIRA GONZALEZ RN on 4/18/2023 at 5:37 PM

## 2023-04-18 NOTE — TELEPHONE ENCOUNTER
Called patient back, no answer, left message to call back.    ERENDIRA GONZALEZ RN on 4/18/2023 at 5:00 PM

## 2023-04-18 NOTE — TELEPHONE ENCOUNTER
Letter was sent to patient on April 13.  See letter in patient's chart.  Okay to give her results listed in letter    Further recommendations will be made once the results of the other tests are available.  Multiple tests were ordered at her office visit.

## 2023-04-18 NOTE — TELEPHONE ENCOUNTER
Nurse Triage SBAR    Situation:   -Results    Background:   -Patient calling, It is okay to leave a detailed message at this number.     Assessment:   -seen on 4/12/23 with Dr. Jackson  -calling to get her results on the CRP an ESR  -FNA RN unable to give patient results base on [place] policy    Recommendation:   Care team: please call patient back at [phone] with results and interpretation, and next steps, thanks!    Reason for Disposition    Caller requesting routine or non-urgent lab result    Protocols used: PCP CALL - NO TRIAGE-A-

## 2023-04-25 ENCOUNTER — HOSPITAL ENCOUNTER (OUTPATIENT)
Dept: ULTRASOUND IMAGING | Facility: HOSPITAL | Age: 63
Discharge: HOME | End: 2023-04-25
Attending: FAMILY MEDICINE
Payer: COMMERCIAL

## 2023-04-25 ENCOUNTER — HOSPITAL ENCOUNTER (OUTPATIENT)
Dept: MRI IMAGING | Facility: HOSPITAL | Age: 63
Discharge: HOME | End: 2023-04-25
Attending: FAMILY MEDICINE
Payer: COMMERCIAL

## 2023-04-25 DIAGNOSIS — G45.3 AF (AMAUROSIS FUGAX): ICD-10-CM

## 2023-04-25 PROCEDURE — 70544 MR ANGIOGRAPHY HEAD W/O DYE: CPT | Mod: XU

## 2023-04-25 PROCEDURE — 93880 EXTRACRANIAL BILAT STUDY: CPT

## 2023-04-25 PROCEDURE — 70551 MRI BRAIN STEM W/O DYE: CPT

## 2023-05-16 ENCOUNTER — HOSPITAL ENCOUNTER (OUTPATIENT)
Dept: CARDIOLOGY | Facility: HOSPITAL | Age: 63
Discharge: HOME | End: 2023-05-16
Attending: FAMILY MEDICINE | Admitting: FAMILY MEDICINE
Payer: COMMERCIAL

## 2023-05-16 DIAGNOSIS — G45.3 AF (AMAUROSIS FUGAX): ICD-10-CM

## 2023-05-16 LAB — LVEF ECHO: NORMAL

## 2023-05-16 PROCEDURE — 999N000208 ECHOCARDIOGRAM COMPLETE

## 2023-05-16 PROCEDURE — 93306 TTE W/DOPPLER COMPLETE: CPT | Mod: 26 | Performed by: INTERNAL MEDICINE

## 2023-06-28 ENCOUNTER — HOSPITAL ENCOUNTER (OUTPATIENT)
Dept: MAMMOGRAPHY | Facility: CLINIC | Age: 63
Discharge: HOME | End: 2023-06-28
Attending: FAMILY MEDICINE | Admitting: FAMILY MEDICINE
Payer: COMMERCIAL

## 2023-06-28 DIAGNOSIS — Z12.31 VISIT FOR SCREENING MAMMOGRAM: ICD-10-CM

## 2023-06-28 PROCEDURE — 77067 SCR MAMMO BI INCL CAD: CPT

## 2023-07-06 ENCOUNTER — LAB (OUTPATIENT)
Dept: LAB | Facility: CLINIC | Age: 63
End: 2023-07-06
Payer: COMMERCIAL

## 2023-07-06 DIAGNOSIS — M85.80 OSTEOPENIA, UNSPECIFIED LOCATION: ICD-10-CM

## 2023-07-06 DIAGNOSIS — R73.01 IMPAIRED FASTING GLUCOSE: Primary | ICD-10-CM

## 2023-07-06 DIAGNOSIS — E78.2 MIXED HYPERLIPIDEMIA: ICD-10-CM

## 2023-07-06 DIAGNOSIS — Z13.1 SCREENING FOR DIABETES MELLITUS: ICD-10-CM

## 2023-07-06 DIAGNOSIS — R73.01 IMPAIRED FASTING GLUCOSE: ICD-10-CM

## 2023-07-06 LAB
CHOLEST SERPL-MCNC: 245 MG/DL
FASTING STATUS PATIENT QL REPORTED: YES
GLUCOSE SERPL-MCNC: 108 MG/DL (ref 70–99)
HDLC SERPL-MCNC: 90 MG/DL
HGB BLD-MCNC: 14.6 G/DL (ref 11.7–15.7)
LDLC SERPL CALC-MCNC: 143 MG/DL
NONHDLC SERPL-MCNC: 155 MG/DL
TRIGL SERPL-MCNC: 58 MG/DL

## 2023-07-06 PROCEDURE — 80061 LIPID PANEL: CPT

## 2023-07-06 PROCEDURE — 85018 HEMOGLOBIN: CPT

## 2023-07-06 PROCEDURE — 82947 ASSAY GLUCOSE BLOOD QUANT: CPT

## 2023-07-06 PROCEDURE — 83036 HEMOGLOBIN GLYCOSYLATED A1C: CPT

## 2023-07-06 PROCEDURE — 36415 COLL VENOUS BLD VENIPUNCTURE: CPT

## 2023-07-07 LAB — HBA1C MFR BLD: 5.4 % (ref 0–5.6)

## 2023-07-13 ASSESSMENT — ENCOUNTER SYMPTOMS
CHILLS: 0
ABDOMINAL PAIN: 0
PARESTHESIAS: 0
DIZZINESS: 0
CONSTIPATION: 0
ARTHRALGIAS: 0
BREAST MASS: 0
JOINT SWELLING: 0
EYE PAIN: 0
DYSURIA: 0
MYALGIAS: 0
HEARTBURN: 0
DIARRHEA: 0
SHORTNESS OF BREATH: 0
HEADACHES: 0
NERVOUS/ANXIOUS: 0
FEVER: 0
PALPITATIONS: 0
SORE THROAT: 0
HEMATOCHEZIA: 0
WEAKNESS: 0
HEMATURIA: 0
COUGH: 0
NAUSEA: 0
FREQUENCY: 0

## 2023-07-20 ENCOUNTER — OFFICE VISIT (OUTPATIENT)
Dept: FAMILY MEDICINE | Facility: CLINIC | Age: 63
End: 2023-07-20
Attending: FAMILY MEDICINE
Payer: COMMERCIAL

## 2023-07-20 VITALS
RESPIRATION RATE: 20 BRPM | HEIGHT: 65 IN | BODY MASS INDEX: 25.62 KG/M2 | TEMPERATURE: 97.4 F | HEART RATE: 73 BPM | WEIGHT: 153.8 LBS | DIASTOLIC BLOOD PRESSURE: 80 MMHG | SYSTOLIC BLOOD PRESSURE: 120 MMHG | OXYGEN SATURATION: 97 %

## 2023-07-20 DIAGNOSIS — E78.2 MIXED HYPERLIPIDEMIA: ICD-10-CM

## 2023-07-20 DIAGNOSIS — M85.80 OSTEOPENIA, UNSPECIFIED LOCATION: ICD-10-CM

## 2023-07-20 DIAGNOSIS — Z23 HIGH PRIORITY FOR 2019-NCOV VACCINE: ICD-10-CM

## 2023-07-20 DIAGNOSIS — Z00.00 ROUTINE HISTORY AND PHYSICAL EXAMINATION OF ADULT: Primary | ICD-10-CM

## 2023-07-20 DIAGNOSIS — Z78.9 VEGAN: ICD-10-CM

## 2023-07-20 LAB — FOLATE SERPL-MCNC: 16.2 NG/ML (ref 4.6–34.8)

## 2023-07-20 PROCEDURE — 36415 COLL VENOUS BLD VENIPUNCTURE: CPT | Performed by: FAMILY MEDICINE

## 2023-07-20 PROCEDURE — 99213 OFFICE O/P EST LOW 20 MIN: CPT | Mod: 25 | Performed by: FAMILY MEDICINE

## 2023-07-20 PROCEDURE — 99396 PREV VISIT EST AGE 40-64: CPT | Mod: 25 | Performed by: FAMILY MEDICINE

## 2023-07-20 PROCEDURE — 91312 COVID-19 BIVALENT 12+ (PFIZER): CPT | Performed by: FAMILY MEDICINE

## 2023-07-20 PROCEDURE — 0124A COVID-19 BIVALENT 12+ (PFIZER): CPT | Performed by: FAMILY MEDICINE

## 2023-07-20 PROCEDURE — 80053 COMPREHEN METABOLIC PANEL: CPT | Performed by: FAMILY MEDICINE

## 2023-07-20 PROCEDURE — 84443 ASSAY THYROID STIM HORMONE: CPT | Performed by: FAMILY MEDICINE

## 2023-07-20 PROCEDURE — 82746 ASSAY OF FOLIC ACID SERUM: CPT | Performed by: FAMILY MEDICINE

## 2023-07-20 PROCEDURE — 82607 VITAMIN B-12: CPT | Performed by: FAMILY MEDICINE

## 2023-07-20 ASSESSMENT — PAIN SCALES - GENERAL: PAINLEVEL: NO PAIN (0)

## 2023-07-20 NOTE — PROGRESS NOTES
Prior to immunization administration, verified patients identity using patient s name and date of birth. Please see Immunization Activity for additional information.     Screening Questionnaire for Adult Immunization    Are you sick today?   No   Do you have allergies to medications, food, a vaccine component or latex?   No   Have you ever had a serious reaction after receiving a vaccination?   No   Do you have a long-term health problem with heart, lung, kidney, or metabolic disease (e.g., diabetes), asthma, a blood disorder, no spleen, complement component deficiency, a cochlear implant, or a spinal fluid leak?  Are you on long-term aspirin therapy?   No   Do you have cancer, leukemia, HIV/AIDS, or any other immune system problem?   No   Do you have a parent, brother, or sister with an immune system problem?   No   In the past 3 months, have you taken medications that affect  your immune system, such as prednisone, other steroids, or anticancer drugs; drugs for the treatment of rheumatoid arthritis, Crohn s disease, or psoriasis; or have you had radiation treatments?   No   Have you had a seizure, or a brain or other nervous system problem?   No   During the past year, have you received a transfusion of blood or blood    products, or been given immune (gamma) globulin or antiviral drug?   No   For women: Are you pregnant or is there a chance you could become       pregnant during the next month?   No   Have you received any vaccinations in the past 4 weeks?   No     Immunization questionnaire answers were all negative.      Patient instructed to remain in clinic for 15 minutes afterwards, and to report any adverse reactions.     Screening performed by Bhavya Bates MA on 7/20/2023 at 8:32 AM.    Answers for HPI/ROS submitted by the patient on 7/13/2023  Frequency of exercise:: 2-3 days/week  Getting at least 3 servings of Calcium per day:: Yes  Diet:: Vegetarian/vegan  Taking medications regularly::  Yes  Medication side effects:: Not applicable  Bi-annual eye exam:: Yes  Dental care twice a year:: Yes  Sleep apnea or symptoms of sleep apnea:: None  abdominal pain: No  Blood in stool: No  Blood in urine: No  chest pain: No  chills: No  congestion: No  constipation: No  cough: No  diarrhea: No  dizziness: No  ear pain: No  eye pain: No  nervous/anxious: No  fever: No  frequency: No  genital sores: No  headaches: No  hearing loss: No  heartburn: No  arthralgias: No  joint swelling: No  peripheral edema: No  mood changes: No  myalgias: No  nausea: No  dysuria: No  palpitations: No  Skin sensation changes: No  sore throat: No  urgency: No  rash: No  shortness of breath: No  visual disturbance: No  weakness: No  pelvic pain: No  vaginal bleeding: No  vaginal discharge: No  tenderness: No  breast mass: No  breast discharge: No  Additional concerns today:: No  Duration of exercise:: 30-45 minutes

## 2023-07-20 NOTE — PROGRESS NOTES
SUBJECTIVE:   CC: Shawna is an 63 year old who presents for preventive health visit.       7/20/2023     7:18 AM   Additional Questions   Roomed by Bhavya Bates   Accompanied by Self     Healthy Habits:     Getting at least 3 servings of Calcium per day:  Yes    Bi-annual eye exam:  Yes    Dental care twice a year:  Yes    Sleep apnea or symptoms of sleep apnea:  None    Diet:  Vegetarian/vegan    Frequency of exercise:  2-3 days/week    Duration of exercise:  30-45 minutes    Taking medications regularly:  Yes    Medication side effects:  Not applicable    Additional concerns today:  No  Imm/Inj        Reviewed her health history including medications and allergies.      Currently taking a vitamin D supplement.  Has history of low bone density.  Last bone density scan was in July 2022.    She has history of hyperlipidemia.  Has made significant diet and lifestyle changes over the past few months.  She is back to a vegan diet and has been exercising regularly.  She has lost 7 pounds.  Recently had labs done to check her cholesterol and her levels had significantly improved.      Recently had mammogram and yearly gynecology visit.  Does not need breast or pelvic exam today.    Recently had Pap smear and those results are brought in today for my review.  Pap smear was negative and HPV testing was negative.  She would like COVID booster.      She has been experiencing right shoulder pain.  Following with [facility].  Had an MRI which showed arthritis.  Received a cortisone injection.  This was painful but has provided benefit.  She did physical therapy and is doing better    The had an episode of amaurosis fugax earlier this year.  Subsequent work-up was negative.  This included carotid ultrasound, echocardiogram and MRI/MRI of the brain.  She was also seen by her eye doctor.  No concerning findings or cause determined.  She is still concerned about what could have caused this episode.  Wonders if it could  "have been a spasm of the blood vessel. Review of systems is otherwise negative.  Gets regular vision and dental exams.                      Social History     Tobacco Use     Smoking status: Never     Smokeless tobacco: Never   Substance Use Topics     Alcohol use: No             2023    10:52 AM   Alcohol Use   Prescreen: >3 drinks/day or >7 drinks/week? Not Applicable     Reviewed orders with patient.  Reviewed health maintenance and updated orders accordingly - Yes  Current Outpatient Medications   Medication Sig Dispense Refill     cholecalciferol, vitamin D3, (VITAMIN D3) 50 mcg (2,000 unit) capsule [CHOLECALCIFEROL, VITAMIN D3, (VITAMIN D3) 50 MCG (2,000 UNIT) CAPSULE] Take 3 capsules (3000 international unit(s)) daily (Patient taking differently: No sig reported)  0       Breast Cancer Screenin/6/2022     9:57 AM   Breast CA Risk Assessment (FHS-7)   Do you have a family history of breast, colon, or ovarian cancer? No / Unknown         Mammogram Screening: Recommended mammography every 1-2 years with patient discussion and risk factor consideration  Pertinent mammograms are reviewed under the imaging tab.    History of abnormal Pap smear: NO - age 30-65 PAP every 5 years with negative HPV co-testing recommended      2015     9:09 AM   PAP / HPV   PAP Negative for squamous intraepithelial lesion or malignancy  Electronically signed by eTss Brand CT (ASCP) on 2015 at  1:10 PM        Reviewed and updated as needed this visit by clinical staff    Allergies  Meds              Reviewed and updated as needed this visit by Provider                     Review of Systems       OBJECTIVE:   /80   Pulse 73   Temp 97.4  F (36.3  C) (Temporal)   Resp 20   Ht 1.651 m (5' 5\")   Wt 69.8 kg (153 lb 12.8 oz)   SpO2 97%   BMI 25.59 kg/m    Physical Exam  GENERAL APPEARANCE: healthy, alert and no distress  EYES: Eyes grossly normal to inspection, PERRL and conjunctivae and sclerae " normal  HENT: ear canals and TM's normal, nose and mouth without ulcers or lesions, oropharynx clear and oral mucous membranes moist  NECK: no adenopathy, no asymmetry, masses, or scars and thyroid normal to palpation  RESP: lungs clear to auscultation - no rales, rhonchi or wheezes  CV: regular rate and rhythm, normal S1 S2, no S3 or S4, no murmur, click or rub, no peripheral edema and peripheral pulses strong  ABDOMEN: soft, nontender, no hepatosplenomegaly, no masses and bowel sounds normal  MS: no musculoskeletal defects are noted and gait is age appropriate without ataxia  SKIN: no suspicious lesions or rashes  NEURO: Normal strength and tone, sensory exam grossly normal, mentation intact and speech normal  PSYCH: mentation appears normal and affect normal/bright        ASSESSMENT/PLAN:   Shawna was seen today for physical and imm/inj.    Diagnoses and all orders for this visit:    Routine history and physical examination of adult  Up-to-date with Pap smear and mammogram and colonoscopy.  Encouraged regular exercise and healthy diet and weight management.  Follow-up in 1 year for annual physical    High priority for 2019-nCoV vaccine    Mixed hyperlipidemia  -     Comprehensive metabolic panel (BMP + Alb, Alk Phos, ALT, AST, Total. Bili, TP); Future  -     TSH; Future  -     Comprehensive metabolic panel (BMP + Alb, Alk Phos, ALT, AST, Total. Bili, TP)  -     TSH  Has had improvement in cholesterol with diet and lifestyle changes.  She will continue these efforts    Vegan  -     Vitamin B12; Future  -     Folate; Future  -     Vitamin B12  -     Folate    Osteopenia, unspecified location  Last DEXA scan was in July 2022.  Recommend follow-up again in 1 year.  Continue with regular weightbearing exercise and exercise to increase strength.  Encouraged adequate calcium and vitamin D intake    Amaurosis Fugax  Had episode of this in early April while doing yoga.  Subsequent work-up including vision exam, MRI/MRI  of brain, echocardiogram and carotid ultrasound unremarkable except for mild plaque buildup bilaterally at the bifurcation.  Sed rate and CRP were normal.  She has history of hyperlipidemia and is working on intensive lifestyle changes with improvement in her lipid levels.  Symptoms could have been due to spasm of blood vessel.  For now, no further evaluation or treatment recommended.  Continue to work on healthy lifestyle changes.              COUNSELING:  Reviewed preventive health counseling, as reflected in patient instructions        She reports that she has never smoked. She has never used smokeless tobacco.          Daisy Jackson MD  [facility]

## 2023-07-21 LAB
ALBUMIN SERPL BCG-MCNC: 4.7 G/DL (ref 3.5–5.2)
ALP SERPL-CCNC: 87 U/L (ref 35–104)
ALT SERPL W P-5'-P-CCNC: 26 U/L (ref 0–50)
ANION GAP SERPL CALCULATED.3IONS-SCNC: 12 MMOL/L (ref 7–15)
AST SERPL W P-5'-P-CCNC: 34 U/L (ref 0–45)
BILIRUB SERPL-MCNC: 0.4 MG/DL
BUN SERPL-MCNC: 8.8 MG/DL (ref 8–23)
CALCIUM SERPL-MCNC: 9.4 MG/DL (ref 8.8–10.2)
CHLORIDE SERPL-SCNC: 106 MMOL/L (ref 98–107)
CREAT SERPL-MCNC: 0.61 MG/DL (ref 0.51–0.95)
DEPRECATED HCO3 PLAS-SCNC: 23 MMOL/L (ref 22–29)
GFR SERPL CREATININE-BSD FRML MDRD: >90 ML/MIN/1.73M2
GLUCOSE SERPL-MCNC: 101 MG/DL (ref 70–99)
POTASSIUM SERPL-SCNC: 3.9 MMOL/L (ref 3.4–5.3)
PROT SERPL-MCNC: 7.1 G/DL (ref 6.4–8.3)
SODIUM SERPL-SCNC: 141 MMOL/L (ref 136–145)
TSH SERPL DL<=0.005 MIU/L-ACNC: 1.67 UIU/ML (ref 0.3–4.2)
VIT B12 SERPL-MCNC: 534 PG/ML (ref 232–1245)

## 2024-07-23 ENCOUNTER — LAB REQUISITION (OUTPATIENT)
Dept: LAB | Facility: CLINIC | Age: 64
End: 2024-07-23
Payer: COMMERCIAL

## 2024-07-23 DIAGNOSIS — Z13.1 ENCOUNTER FOR SCREENING FOR DIABETES MELLITUS: ICD-10-CM

## 2024-07-23 DIAGNOSIS — Z78.9 OTHER SPECIFIED HEALTH STATUS: ICD-10-CM

## 2024-07-23 DIAGNOSIS — Z13.220 ENCOUNTER FOR SCREENING FOR LIPOID DISORDERS: ICD-10-CM

## 2024-07-23 LAB
ANION GAP SERPL CALCULATED.3IONS-SCNC: 15 MMOL/L (ref 7–15)
BUN SERPL-MCNC: 9.8 MG/DL (ref 8–23)
CALCIUM SERPL-MCNC: 9.4 MG/DL (ref 8.8–10.4)
CHLORIDE SERPL-SCNC: 102 MMOL/L (ref 98–107)
CHOLEST SERPL-MCNC: 237 MG/DL
CREAT SERPL-MCNC: 0.73 MG/DL (ref 0.51–0.95)
EGFRCR SERPLBLD CKD-EPI 2021: >90 ML/MIN/1.73M2
FASTING STATUS PATIENT QL REPORTED: ABNORMAL
FASTING STATUS PATIENT QL REPORTED: ABNORMAL
GLUCOSE SERPL-MCNC: 107 MG/DL (ref 70–99)
HCO3 SERPL-SCNC: 24 MMOL/L (ref 22–29)
HDLC SERPL-MCNC: 97 MG/DL
LDLC SERPL CALC-MCNC: 123 MG/DL
NONHDLC SERPL-MCNC: 140 MG/DL
POTASSIUM SERPL-SCNC: 3.9 MMOL/L (ref 3.4–5.3)
SODIUM SERPL-SCNC: 141 MMOL/L (ref 135–145)
TRIGL SERPL-MCNC: 85 MG/DL
VIT B12 SERPL-MCNC: 484 PG/ML (ref 232–1245)

## 2024-07-23 PROCEDURE — 80061 LIPID PANEL: CPT | Performed by: FAMILY MEDICINE

## 2024-07-23 PROCEDURE — 82374 ASSAY BLOOD CARBON DIOXIDE: CPT | Performed by: FAMILY MEDICINE

## 2024-07-23 PROCEDURE — 82607 VITAMIN B-12: CPT | Performed by: FAMILY MEDICINE

## 2024-08-29 ENCOUNTER — HOSPITAL ENCOUNTER (OUTPATIENT)
Dept: MAMMOGRAPHY | Facility: CLINIC | Age: 64
Discharge: HOME | End: 2024-08-29
Attending: FAMILY MEDICINE | Admitting: FAMILY MEDICINE
Payer: COMMERCIAL

## 2024-08-29 DIAGNOSIS — Z12.31 SCREENING MAMMOGRAM FOR BREAST CANCER: ICD-10-CM

## 2024-08-29 PROCEDURE — 77063 BREAST TOMOSYNTHESIS BI: CPT

## 2024-09-01 ENCOUNTER — HEALTH MAINTENANCE LETTER (OUTPATIENT)
Age: 64
End: 2024-09-01

## 2025-07-28 ENCOUNTER — PATIENT OUTREACH (OUTPATIENT)
Dept: CARE COORDINATION | Facility: CLINIC | Age: 65
End: 2025-07-28
Payer: COMMERCIAL